# Patient Record
Sex: FEMALE | Race: BLACK OR AFRICAN AMERICAN | HISPANIC OR LATINO | Employment: OTHER | URBAN - METROPOLITAN AREA
[De-identification: names, ages, dates, MRNs, and addresses within clinical notes are randomized per-mention and may not be internally consistent; named-entity substitution may affect disease eponyms.]

---

## 2018-06-18 ENCOUNTER — APPOINTMENT (EMERGENCY)
Dept: RADIOLOGY | Facility: HOSPITAL | Age: 68
End: 2018-06-18
Payer: COMMERCIAL

## 2018-06-18 ENCOUNTER — HOSPITAL ENCOUNTER (EMERGENCY)
Facility: HOSPITAL | Age: 68
Discharge: HOME | End: 2018-06-18
Attending: EMERGENCY MEDICINE
Payer: COMMERCIAL

## 2018-06-18 VITALS
SYSTOLIC BLOOD PRESSURE: 126 MMHG | HEART RATE: 102 BPM | OXYGEN SATURATION: 99 % | RESPIRATION RATE: 22 BRPM | WEIGHT: 127.8 LBS | TEMPERATURE: 97.2 F | DIASTOLIC BLOOD PRESSURE: 66 MMHG

## 2018-06-18 DIAGNOSIS — R06.00 DYSPNEA: ICD-10-CM

## 2018-06-18 DIAGNOSIS — J45.901 ASTHMA EXACERBATION: Primary | ICD-10-CM

## 2018-06-18 DIAGNOSIS — B34.9 VIRAL SYNDROME: ICD-10-CM

## 2018-06-18 LAB
ANION GAP SERPL CALCULATED.3IONS-SCNC: 7 MMOL/L (ref 4–13)
BASOPHILS # BLD AUTO: 0.01 THOUSANDS/ΜL (ref 0–0.1)
BASOPHILS NFR BLD AUTO: 0 % (ref 0–1)
BUN SERPL-MCNC: 16 MG/DL (ref 5–25)
CALCIUM SERPL-MCNC: 8.9 MG/DL (ref 8.3–10.1)
CHLORIDE SERPL-SCNC: 100 MMOL/L (ref 100–108)
CO2 SERPL-SCNC: 30 MMOL/L (ref 21–32)
CREAT SERPL-MCNC: 1.01 MG/DL (ref 0.6–1.3)
EOSINOPHIL # BLD AUTO: 0.07 THOUSAND/ΜL (ref 0–0.61)
EOSINOPHIL NFR BLD AUTO: 1 % (ref 0–6)
ERYTHROCYTE [DISTWIDTH] IN BLOOD BY AUTOMATED COUNT: 13.2 % (ref 11.6–15.1)
GFR SERPL CREATININE-BSD FRML MDRD: 57 ML/MIN/1.73SQ M
GLUCOSE SERPL-MCNC: 335 MG/DL (ref 65–140)
HCT VFR BLD AUTO: 37.8 % (ref 34.8–46.1)
HGB BLD-MCNC: 13 G/DL (ref 11.5–15.4)
LYMPHOCYTES # BLD AUTO: 3.36 THOUSANDS/ΜL (ref 0.6–4.47)
LYMPHOCYTES NFR BLD AUTO: 24 % (ref 14–44)
MCH RBC QN AUTO: 30.9 PG (ref 26.8–34.3)
MCHC RBC AUTO-ENTMCNC: 34.4 G/DL (ref 31.4–37.4)
MCV RBC AUTO: 90 FL (ref 82–98)
MONOCYTES # BLD AUTO: 1.08 THOUSAND/ΜL (ref 0.17–1.22)
MONOCYTES NFR BLD AUTO: 8 % (ref 4–12)
NEUTROPHILS # BLD AUTO: 9.41 THOUSANDS/ΜL (ref 1.85–7.62)
NEUTS SEG NFR BLD AUTO: 68 % (ref 43–75)
NRBC BLD AUTO-RTO: 0 /100 WBCS
NT-PROBNP SERPL-MCNC: 103 PG/ML
PLATELET # BLD AUTO: 260 THOUSANDS/UL (ref 149–390)
PMV BLD AUTO: 10 FL (ref 8.9–12.7)
POTASSIUM SERPL-SCNC: 3.1 MMOL/L (ref 3.5–5.3)
RBC # BLD AUTO: 4.21 MILLION/UL (ref 3.81–5.12)
SODIUM SERPL-SCNC: 137 MMOL/L (ref 136–145)
TROPONIN I SERPL-MCNC: <0.02 NG/ML
WBC # BLD AUTO: 13.93 THOUSAND/UL (ref 4.31–10.16)

## 2018-06-18 PROCEDURE — 85025 COMPLETE CBC W/AUTO DIFF WBC: CPT | Performed by: EMERGENCY MEDICINE

## 2018-06-18 PROCEDURE — 36415 COLL VENOUS BLD VENIPUNCTURE: CPT | Performed by: EMERGENCY MEDICINE

## 2018-06-18 PROCEDURE — 96374 THER/PROPH/DIAG INJ IV PUSH: CPT

## 2018-06-18 PROCEDURE — 84484 ASSAY OF TROPONIN QUANT: CPT | Performed by: EMERGENCY MEDICINE

## 2018-06-18 PROCEDURE — 99284 EMERGENCY DEPT VISIT MOD MDM: CPT

## 2018-06-18 PROCEDURE — 93005 ELECTROCARDIOGRAM TRACING: CPT

## 2018-06-18 PROCEDURE — 83880 ASSAY OF NATRIURETIC PEPTIDE: CPT | Performed by: EMERGENCY MEDICINE

## 2018-06-18 PROCEDURE — 71046 X-RAY EXAM CHEST 2 VIEWS: CPT

## 2018-06-18 PROCEDURE — 94640 AIRWAY INHALATION TREATMENT: CPT

## 2018-06-18 PROCEDURE — 80048 BASIC METABOLIC PNL TOTAL CA: CPT | Performed by: EMERGENCY MEDICINE

## 2018-06-18 RX ORDER — AZITHROMYCIN 250 MG/1
TABLET, FILM COATED ORAL
Qty: 4 TABLET | Refills: 0 | Status: SHIPPED | OUTPATIENT
Start: 2018-06-18 | End: 2018-06-22

## 2018-06-18 RX ORDER — ALBUTEROL SULFATE 2.5 MG/3ML
5 SOLUTION RESPIRATORY (INHALATION) ONCE
Status: COMPLETED | OUTPATIENT
Start: 2018-06-18 | End: 2018-06-18

## 2018-06-18 RX ORDER — AZITHROMYCIN 250 MG/1
500 TABLET, FILM COATED ORAL ONCE
Status: COMPLETED | OUTPATIENT
Start: 2018-06-18 | End: 2018-06-18

## 2018-06-18 RX ORDER — METHYLPREDNISOLONE SODIUM SUCCINATE 125 MG/2ML
125 INJECTION, POWDER, LYOPHILIZED, FOR SOLUTION INTRAMUSCULAR; INTRAVENOUS ONCE
Status: COMPLETED | OUTPATIENT
Start: 2018-06-18 | End: 2018-06-18

## 2018-06-18 RX ORDER — PREDNISONE 20 MG/1
20 TABLET ORAL DAILY
Qty: 12 TABLET | Refills: 0 | Status: SHIPPED | OUTPATIENT
Start: 2018-06-18 | End: 2018-08-15

## 2018-06-18 RX ADMIN — IPRATROPIUM BROMIDE 0.5 MG: 0.5 SOLUTION RESPIRATORY (INHALATION) at 21:20

## 2018-06-18 RX ADMIN — ALBUTEROL SULFATE 5 MG: 2.5 SOLUTION RESPIRATORY (INHALATION) at 21:20

## 2018-06-18 RX ADMIN — METHYLPREDNISOLONE SODIUM SUCCINATE 125 MG: 125 INJECTION, POWDER, FOR SOLUTION INTRAMUSCULAR; INTRAVENOUS at 21:16

## 2018-06-18 RX ADMIN — AZITHROMYCIN MONOHYDRATE 500 MG: 250 TABLET ORAL at 22:05

## 2018-06-19 LAB
ATRIAL RATE: 93 BPM
P AXIS: 48 DEGREES
PR INTERVAL: 116 MS
QRS AXIS: 61 DEGREES
QRSD INTERVAL: 88 MS
QT INTERVAL: 324 MS
QTC INTERVAL: 402 MS
T WAVE AXIS: 32 DEGREES
VENTRICULAR RATE: 93 BPM

## 2018-06-19 PROCEDURE — 93010 ELECTROCARDIOGRAM REPORT: CPT | Performed by: INTERNAL MEDICINE

## 2018-06-19 NOTE — ED PROVIDER NOTES
History  Chief Complaint   Patient presents with    Cough     productive cough for one week, exertional SOB, seen at [facility], given steroids, states symptoms remain     76-year-old female presents for evaluation of productive cough and dyspnea this been ongoing for the last week  She also has a history of asthma  Breathing treatment administered prior to arrival with mild relief  She also reports chest pain that is only exacerbated by coughing and palpation  Nonradiating a denies nausea, vomiting, diaphoresis  Also reports subjective fevers and generalized body aches  Patient reports completing a 5 day course of steroids 2 days ago  None       Past Medical History:   Diagnosis Date    Asthma     Diabetes mellitus ([facility] [address] )     Hyperlipidemia     Hypertension        History reviewed  No pertinent surgical history  History reviewed  No pertinent family history  I have reviewed and agree with the history as documented  Social History   Substance Use Topics    Smoking status: Never Smoker    Smokeless tobacco: Never Used    Alcohol use No        Review of Systems   Constitutional: Positive for fever  Negative for chills and diaphoresis  HENT: Positive for congestion  Negative for rhinorrhea  Eyes: Negative for pain and visual disturbance  Respiratory: Positive for cough and shortness of breath  Negative for wheezing  Cardiovascular: Positive for chest pain  Negative for leg swelling  Gastrointestinal: Negative for abdominal pain, diarrhea, nausea and vomiting  Genitourinary: Negative for difficulty urinating, dysuria, frequency and urgency  Musculoskeletal: Negative for back pain and neck pain  Skin: Negative for color change and rash  Neurological: Negative for syncope, numbness and headaches  All other systems reviewed and are negative        Physical Exam  ED Triage Vitals [06/18/18 1959]   Temperature Pulse Respirations Blood Pressure SpO2   (!) 97 2 °F (36 2 °C) (!) 114 18 (!) 174/95 96 %      Temp Source Heart Rate Source Patient Position - Orthostatic VS BP Location FiO2 (%)   Temporal Monitor Sitting Right arm --      Pain Score       --           Orthostatic Vital Signs  Vitals:    06/18/18 2050 06/18/18 2130 06/18/18 2207 06/18/18 2230   BP: 144/76 142/76 131/71 126/66   Pulse: 92 90 (!) 115 102   Patient Position - Orthostatic VS: Lying Lying  Lying       Physical Exam   Constitutional: She is oriented to person, place, and time  She appears well-developed and well-nourished  No distress  HENT:   Head: Normocephalic and atraumatic  Eyes: Conjunctivae and EOM are normal    Neck: Normal range of motion  Neck supple  Cardiovascular: Normal rate and regular rhythm  Pulmonary/Chest: Effort normal  No respiratory distress  She has wheezes  She has no rales  Abdominal: Soft  Bowel sounds are normal  There is no tenderness  There is no guarding  Musculoskeletal: Normal range of motion  She exhibits no edema or tenderness  Neurological: She is alert and oriented to person, place, and time  No cranial nerve deficit  Skin: Skin is warm  She is not diaphoretic  No erythema  Psychiatric: She has a normal mood and affect  Her behavior is normal    Nursing note and vitals reviewed        ED Medications  Medications   albuterol inhalation solution 5 mg (5 mg Nebulization Given 6/18/18 2120)   ipratropium (ATROVENT) 0 02 % inhalation solution 0 5 mg (0 5 mg Nebulization Given 6/18/18 2120)   methylPREDNISolone sodium succinate (Solu-MEDROL) injection 125 mg (125 mg Intravenous Given 6/18/18 2116)   azithromycin (ZITHROMAX) tablet 500 mg (500 mg Oral Given 6/18/18 2205)       Diagnostic Studies  Results Reviewed     Procedure Component Value Units Date/Time    Basic metabolic panel [[id number]]  (Abnormal) Collected:  06/18/18 2046    Lab Status:  Final result Specimen:  Blood from Arm, Right Updated:  06/18/18 2113     Sodium 137 mmol/L      Potassium 3 1 (L) mmol/L Chloride 100 mmol/L      CO2 30 mmol/L      Anion Gap 7 mmol/L      BUN 16 mg/dL      Creatinine 1 01 mg/dL      Glucose 335 (H) mg/dL      Calcium 8 9 mg/dL      eGFR 57 ml/min/1 73sq m     Narrative:         National Kidney Disease Education Program recommendations are as follows:  GFR calculation is accurate only with a steady state creatinine  Chronic Kidney disease less than 60 ml/min/1 73 sq  meters  Kidney failure less than 15 ml/min/1 73 sq  meters  NT-BNP PRO [[id number]]  (Normal) Collected:  06/18/18 2046    Lab Status:  Final result Specimen:  Blood from Arm, Right Updated:  06/18/18 2113     NT-proBNP 103 pg/mL     Troponin I [[id number]]  (Normal) Collected:  06/18/18 2046    Lab Status:  Final result Specimen:  Blood from Arm, Right Updated:  06/18/18 2110     Troponin I <0 02 ng/mL     CBC and differential [[id number]]  (Abnormal) Collected:  06/18/18 2046    Lab Status:  Final result Specimen:  Blood from Arm, Right Updated:  06/18/18 2052     WBC 13 93 (H) Thousand/uL      RBC 4 21 Million/uL      Hemoglobin 13 0 g/dL      Hematocrit 37 8 %      MCV 90 fL      MCH 30 9 pg      MCHC 34 4 g/dL      RDW 13 2 %      MPV 10 0 fL      Platelets 677 Thousands/uL      nRBC 0 /100 WBCs      Neutrophils Relative 68 %      Lymphocytes Relative 24 %      Monocytes Relative 8 %      Eosinophils Relative 1 %      Basophils Relative 0 %      Neutrophils Absolute 9 41 (H) Thousands/µL      Lymphocytes Absolute 3 36 Thousands/µL      Monocytes Absolute 1 08 Thousand/µL      Eosinophils Absolute 0 07 Thousand/µL      Basophils Absolute 0 01 Thousands/µL                  X-ray chest 2 views   ED Interpretation by Heavenly Goode MD (06/18 2147)   The CXR was ordered by resident and interpreted by me independently  On my read, it appears normal without acute abnormalities               Procedures  Procedures      Phone Consults  ED Phone Contact    ED Course  ED Course as of Jun 19 0038 Mon Jun 18, 2018 2108 Procedure Note: EKG  Date/Time: 06/18/18 9:07 PM   Performed by: Lan Smith by: Cl Bhatia  ECG interpreted by me, the ED Provider: yes   The EKG demonstrates:  Rate 93  Rhythm NSR  QTc 402  No ST elevatons/depressions      2140 Patient reports improvement of symptoms with duoneb  2233 Patient states she feels much better after duoneb  Will DC, rx for azithro and steroid taper  Follow up with PCP  HEART Risk Score      Most Recent Value   History  0 Filed at: 06/18/2018 2143   ECG  0 Filed at: 06/18/2018 2143   Age  2 Filed at: 06/18/2018 2143   Risk Factors  1 Filed at: 06/18/2018 2143   Troponin  0 Filed at: 06/18/2018 2143   Heart Score Risk Calculator   History  0 Filed at: 06/18/2018 2143   ECG  0 Filed at: 06/18/2018 2143   Age  2 Filed at: 06/18/2018 2143   Risk Factors  1 Filed at: 06/18/2018 2143   Troponin  0 Filed at: 06/18/2018 2143   HEART Score  3 Filed at: 06/18/2018 2143   HEART Score  3 Filed at: 06/18/2018 2143                            MDM  Number of Diagnoses or Management Options  Diagnosis management comments: 69year old female presenting with cough, congestion, wheezing  Will obtain labs, CXR, breathing tx, steroids  Reassess  CritCare Time    Disposition  Final diagnoses:   Asthma exacerbation   Dyspnea   Viral syndrome     Time reflects when diagnosis was documented in both MDM as applicable and the Disposition within this note     Time User Action Codes Description Comment    6/18/2018 10:34 PM Cl Cluster Add [E37 423] Asthma exacerbation     6/18/2018 10:34 PM Cl Cluster Add [R06 00] Dyspnea     6/18/2018 10:34 PM Cl Cluster Add [B34 9] Viral syndrome       ED Disposition     ED Disposition Condition Comment    Discharge  Mildred Fierro discharge to home/self care      Condition at discharge: Stable        Follow-up Information     Follow up With Specialties Details Why Contact Info Additional [facility] [place] [facility] In 1 day For further evaluation [address] [facility] [address] [address]  757 [facility] Emergency Department Emergency Medicine  If symptoms worsen 3050 [facility] ED, [address] , [place], [place], [address]          Discharge Medication List as of 6/18/2018 10:40 PM      START taking these medications    Details   azithromycin (ZITHROMAX) 250 mg tablet Take 1 tablet daily x 4 days (First 2 tablets administered in Emergency Department), Print      predniSONE 20 mg tablet Take 1 tablet (20 mg total) by mouth daily Take 3 tablets (60mg) for 2 days, then take 2 tablets (40mg) for 2 days, then 1 tablet (20mg) for 2 days  , Starting Mon 6/18/2018, Print           No discharge procedures on file  ED Provider  Attending physically available and evaluated Traceymyke Viktoriya VERNON managed the patient along with the ED Attending      Electronically Signed by         Charisma Vallejo DO  06/19/18 0038

## 2018-06-19 NOTE — DISCHARGE INSTRUCTIONS
Asma, cuidados ambulatorios   INFORMACIÓN GENERAL:   El asma  es margot enfermedad pulmonar que dificulta la respiración  La inflamación crónica y las reacciones a los factores desencadenantes estrechan las vías respiratorias en anusha pulmones  El asma puede ser mortal si no se controla  Los síntomas comunes incluyen los siguientes:   · Tos     · Sibilancias     · Falta de aliento     · Opresión en el pecho  Busque cuidados inmediatos para los siguientes síntomas:   · Falta de aliento severa    · Labios o uñas azules o grises    · La piel alrededor de cabrera karri y costillas se hunde con cada respiro    · Falta de [place], aún después de tomarse anusha medicamentos de uso a corto plazo según anusha indicaciones    · Las lecturas numéricas del medidor de cabrera flujo rambo están en la miya tati de cabrera plan de acción para el asma  El tratamiento para el asma  dependerá de cabrera severidad  Es posible que los medicamentos disminuyan la inflamación, alma las vías respiratorias y faciliten cabrera respiración  Los medicamentos pueden inhalarse, tomarse en pastilla o [place]  Los [place] de uso a corto plazo alivian anusha síntomas rápidamente  Los medicamentos a jaison plazo se usan para prevenir ataques futuros  Puede ser que usted también necesite medicamentos para ayudar a controlar anusha alergias  Massimo Tania y prevenga futuros ataques de asma:   · Siga cabrera plan de acción para el asma  Ifrah es un plan escrito que usted y cabrera médico Jb Trotter  Ifrah explica cual medicamento usted necesita y si es necesario, y cuando cambiar la dosis  También explica cómo controlar los síntomas y utilizar un medidor de flujo rambo (alto)  El medidor mide qué tan alessia están funcionando los pulmones  · [address] , wagner las [place], el reflujo estomacal y la apnea de sueño  · Identifique y evite los factores desencadenantes  Estos pueden [employer], los ácaros del Silva, el moho y las cucarachas       · No fume y evite a los fumadores  Si usted fuma, nunca es tarde para dejar de hacerlo  Consulte con anthony médico si necesita ayuda para dejar de fumar  · Consulte sobre la vacuna contra la gripe  La gripe puede empeorar anthony asma  Es posible que necesite de margot vacuna contra la gripe cada año  Programe margot tariq de seguimiento con anthony proveedor de mindy según indicaciones:  Usted tendrá que regresar para asegurarse de que anthony medicamento esté funcionando y anusha síntomas están controlados  Es probable que a usted lo refieran a un especialista en asma o alergias  Seguramente le pedirán que anote los valores numéricos de anthony medidor del flujo rambo para que los lleve a anusha citas de seguimiento  Anote anusha preguntas para que las recuerde david anusha citas de seguimiento  ACUERDOS SOBRE ANTHONY CUIDADO:   Usted tiene el derecho de participar en la planificación de anthony cuidado  Aprenda todo lo que pueda sobre anthony condición y wagner darle tratamiento  Discuta con anusha médicos anusha opciones de tratamiento para juntos decidir el cuidado que usted quiere recibir  Usted siempre tiene el derecho a rechazar anthony tratamiento  Esta información es sólo para uso en educación  Anthony intención no es darle un consejo médico sobre enfermedades o tratamientos  Colsulte con anthony [employer] antes de seguir cualquier régimen médico para saber si es seguro y efectivo para usted  © 2014 [address] is for End User's use only and may not be sold, redistributed or otherwise used for commercial purposes  All illustrations and images included in CareNotes® are the copyrighted property of A D A M , Inc  or Pb Lyle

## 2018-06-19 NOTE — ED ATTENDING ATTESTATION
Jalyn Atkins MD, saw and evaluated the patient  All available labs and X-rays were ordered by me or the resident and have been reviewed by myself  I discussed the patient with the resident / non-physician and agree with the resident's / non-physician practitioner's findings and plan as documented in the resident's / non-physician practicitioner's note, except where noted  At this point, I agree with the current assessment done in the ED  Chief Complaint   Patient presents with    Cough     productive cough for one week, exertional SOB, seen at [facility], given steroids, states symptoms remain     This is a 70-year-old female presenting for evaluation of cough x1 week with shortness of breath  The patient states that she was given some steroids about a week ago and it seemed to help her symptoms little bit but she finished steroids and was offered for 2 days but despite that she has been having worsened symptoms  Denies any fevers chills nausea vomiting  She does have a lot of coughing that is sometimes productive  Denies dizziness or lightheadedness  She has body aches all over  PMH:  - asthma  - DM  - HTN  - HLD  PSH:  - none  No smoking, drinking, drugs  PE:  Vitals:    06/18/18 2050 06/18/18 2130 06/18/18 2207 06/18/18 2230   BP: 144/76 142/76 131/71 126/66   BP Location: Left arm Right arm Right arm Left arm   Pulse: 92 90 (!) 115 102   Resp: 15 15 18 22   Temp:       TempSrc:       SpO2: 96% 99% 95% 99%   Weight:       General: VSS, NAD, awake, alert  Well-nourished, well-developed  Appears stated age  Speaking normally in full sentences  Head: Normocephalic, atraumatic, nontender  Eyes: PERRL, EOM-I  No diplopia  No hyphema  No subconjunctival hemorrhages  Symmetrical lids  ENT: Atraumatic external nose and ears  MMM  No malocclusion  No stridor  Normal phonation  No drooling  Normal swallowing  Neck: Symmetric, trachea midline  No JVD    CV: RRR  +S1/S2  No murmurs or gallops  Peripheral pulses +2 throughout  No chest wall tenderness  Lungs:   Unlabored No retractions  Wheezing expiratory  lungs sounds equal bilateral    No tachypnea  Abd: +BS, soft, NT/ND    MSK:   FROM   Back:   No rashes  Skin: Dry, intact  Neuro: AAOx3, GCS 15, CN II-XII grossly intact  Motor grossly intact  Psychiatric/Behavioral: Appropriate mood and affect   Exam: deferred  A:  - bronchitis/pna  P:  - cardiac workup  - likely DC home   - 13 point ROS was performed and all are normal unless stated in the history above  - Nursing note reviewed  Vitals reviewed  - Orders placed by myself and/or advanced practitioner / resident     - Previous chart was reviewed  - No language barrier    - History obtained from patient  - There are no limitations to the history obtained  - Critical care time: Not applicable for this patient  Final Diagnosis:  1  Asthma exacerbation    2  Dyspnea    3  Viral syndrome        ED Course as of Jun 18 2346   Mon Jun 18, 2018 2056 On steroid regimen   WBC: (!) 13 93     Medications   albuterol inhalation solution 5 mg (5 mg Nebulization Given 6/18/18 2120)   ipratropium (ATROVENT) 0 02 % inhalation solution 0 5 mg (0 5 mg Nebulization Given 6/18/18 2120)   methylPREDNISolone sodium succinate (Solu-MEDROL) injection 125 mg (125 mg Intravenous Given 6/18/18 2116)   azithromycin (ZITHROMAX) tablet 500 mg (500 mg Oral Given 6/18/18 2205)     X-ray chest 2 views   ED Interpretation   The CXR was ordered by resident and interpreted by me independently  On my read, it appears normal without acute abnormalities           Orders Placed This Encounter   Procedures    X-ray chest 2 views    CBC and differential    Basic metabolic panel    Troponin I    NT-BNP PRO    Continuous cardiac monitoring    Continuous pulse oximetry    Insert peripheral IV    ECG 12 lead     Labs Reviewed   CBC AND DIFFERENTIAL - Abnormal        Result Value Ref Range Status    WBC 13 93 (*) 4 31 - 10 16 Thousand/uL Final    RBC 4 21  3 81 - 5 12 Million/uL Final    Hemoglobin 13 0  11 5 - 15 4 g/dL Final    Hematocrit 37 8  34 8 - 46 1 % Final    MCV 90  82 - 98 fL Final    MCH 30 9  26 8 - 34 3 pg Final    MCHC 34 4  31 4 - 37 4 g/dL Final    RDW 13 2  11 6 - 15 1 % Final    MPV 10 0  8 9 - 12 7 fL Final    Platelets 902  121 - 390 Thousands/uL Final    nRBC 0  /100 WBCs Final    Neutrophils Relative 68  43 - 75 % Final    Lymphocytes Relative 24  14 - 44 % Final    Monocytes Relative 8  4 - 12 % Final    Eosinophils Relative 1  0 - 6 % Final    Basophils Relative 0  0 - 1 % Final    Neutrophils Absolute 9 41 (*) 1 85 - 7 62 Thousands/µL Final    Lymphocytes Absolute 3 36  0 60 - 4 47 Thousands/µL Final    Monocytes Absolute 1 08  0 17 - 1 22 Thousand/µL Final    Eosinophils Absolute 0 07  0 00 - 0 61 Thousand/µL Final    Basophils Absolute 0 01  0 00 - 0 10 Thousands/µL Final   BASIC METABOLIC PANEL - Abnormal     Sodium 137  136 - 145 mmol/L Final    Potassium 3 1 (*) 3 5 - 5 3 mmol/L Final    Chloride 100  100 - 108 mmol/L Final    CO2 30  21 - 32 mmol/L Final    Anion Gap 7  4 - 13 mmol/L Final    BUN 16  5 - 25 mg/dL Final    Creatinine 1 01  0 60 - 1 30 mg/dL Final    Comment: Standardized to IDMS reference method    Glucose 335 (*) 65 - 140 mg/dL Final    Comment:   If the patient is fasting, the ADA then defines impaired fasting glucose as > 100 mg/dL and diabetes as > or equal to 123 mg/dL  Specimen collection should occur prior to Sulfasalazine administration due to the potential for falsely depressed results  Specimen collection should occur prior to Sulfapyridine administration due to the potential for falsely elevated results      Calcium 8 9  8 3 - 10 1 mg/dL Final    eGFR 57  ml/min/1 73sq m Final    Narrative:     National Kidney Disease Education Program recommendations are as follows:  GFR calculation is accurate only with a steady state creatinine  Chronic Kidney disease less than 60 ml/min/1 73 sq  meters  Kidney failure less than 15 ml/min/1 73 sq  meters  TROPONIN I - Normal    Troponin I <0 02  <=0 04 ng/mL Final    Comment: 3Autovalidation override  [employer] Chemistry analyzer 99% cutoff is > 0 04 ng/mL in network labs     o cTnI 99% cutoff is useful only when applied to patients in the clinical setting of myocardial ischemia   o cTnI 99% cutoff should be interpreted in the context of clinical history, ECG findings and possibly cardiac imaging to establish correct diagnosis  o cTnI 99% cutoff may be suggestive but clearly not indicative of a coronary event without the clinical setting of myocardial ischemia  NT-BNP PRO (BRAIN NATRIURETIC PEPTIDE) - Normal    NT-proBNP 103  <125 pg/mL Final     Time reflects when diagnosis was documented in both MDM as applicable and the Disposition within this note     Time User Action Codes Description Comment    6/18/2018 10:34 PM Carlos Rojas Add [F92 307] Asthma exacerbation     6/18/2018 10:34 PM Carlos Rojas Add [R06 00] Dyspnea     6/18/2018 10:34 PM Carlos Rojas Add [B34 9] Viral syndrome       ED Disposition     ED Disposition Condition Comment    Discharge  [facility] discharge to home/self care      Condition at discharge: Stable        Follow-up Information     Follow up With Specialties Details Why Contact Info Additional [place] [address] In 1 day For further evaluation [address] [place] [address] Drive [address] 219 [facility] Emergency Department Emergency Medicine  If symptoms worsen [address]  [phone] [place] ED, [address] [place] [address], [address]        Discharge Medication List as of 6/18/2018 10:40 PM      START taking these medications    Details   azithromycin (ZITHROMAX) 250 mg tablet Take 1 tablet daily x 4 days (First 2 tablets administered in Emergency Department), Print      predniSONE 20 mg tablet Take 1 tablet (20 mg total) by mouth daily Take 3 tablets (60mg) for 2 days, then take 2 tablets (40mg) for 2 days, then 1 tablet (20mg) for 2 days  , Starting Mon 6/18/2018, Print           No discharge procedures on file  None       Portions of the record may have been created with voice recognition software  Occasional wrong word or "sound a like" substitutions may have occurred due to the inherent limitations of voice recognition software  Read the chart carefully and recognize, using context, where substitutions have occurred      Electronically signed by:  Kaitlyn Borrego

## 2018-08-15 ENCOUNTER — APPOINTMENT (EMERGENCY)
Dept: CT IMAGING | Facility: HOSPITAL | Age: 68
End: 2018-08-15
Payer: COMMERCIAL

## 2018-08-15 ENCOUNTER — HOSPITAL ENCOUNTER (EMERGENCY)
Facility: HOSPITAL | Age: 68
Discharge: HOME | End: 2018-08-15
Attending: EMERGENCY MEDICINE | Admitting: EMERGENCY MEDICINE
Payer: COMMERCIAL

## 2018-08-15 VITALS
OXYGEN SATURATION: 98 % | RESPIRATION RATE: 16 BRPM | TEMPERATURE: 98.1 F | HEART RATE: 75 BPM | DIASTOLIC BLOOD PRESSURE: 67 MMHG | WEIGHT: 129 LBS | SYSTOLIC BLOOD PRESSURE: 142 MMHG

## 2018-08-15 DIAGNOSIS — R10.9 ABDOMINAL PAIN: ICD-10-CM

## 2018-08-15 DIAGNOSIS — R19.7 DIARRHEA: Primary | ICD-10-CM

## 2018-08-15 LAB
ALBUMIN SERPL BCP-MCNC: 3.7 G/DL (ref 3.5–5)
ALP SERPL-CCNC: 98 U/L (ref 46–116)
ALT SERPL W P-5'-P-CCNC: 17 U/L (ref 12–78)
ANION GAP SERPL CALCULATED.3IONS-SCNC: 7 MMOL/L (ref 4–13)
AST SERPL W P-5'-P-CCNC: 14 U/L (ref 5–45)
BACTERIA UR QL AUTO: ABNORMAL /HPF
BASOPHILS # BLD AUTO: 0.02 THOUSANDS/ΜL (ref 0–0.1)
BASOPHILS NFR BLD AUTO: 0 % (ref 0–1)
BILIRUB SERPL-MCNC: 0.2 MG/DL (ref 0.2–1)
BILIRUB UR QL STRIP: NEGATIVE
BUN SERPL-MCNC: 14 MG/DL (ref 5–25)
CALCIUM SERPL-MCNC: 9.5 MG/DL (ref 8.3–10.1)
CHLORIDE SERPL-SCNC: 107 MMOL/L (ref 100–108)
CLARITY UR: CLEAR
CO2 SERPL-SCNC: 30 MMOL/L (ref 21–32)
COLOR UR: YELLOW
CREAT SERPL-MCNC: 0.86 MG/DL (ref 0.6–1.3)
EOSINOPHIL # BLD AUTO: 0.26 THOUSAND/ΜL (ref 0–0.61)
EOSINOPHIL NFR BLD AUTO: 3 % (ref 0–6)
ERYTHROCYTE [DISTWIDTH] IN BLOOD BY AUTOMATED COUNT: 14 % (ref 11.6–15.1)
GFR SERPL CREATININE-BSD FRML MDRD: 69 ML/MIN/1.73SQ M
GLUCOSE SERPL-MCNC: 154 MG/DL (ref 65–140)
GLUCOSE UR STRIP-MCNC: NEGATIVE MG/DL
HCT VFR BLD AUTO: 35.4 % (ref 34.8–46.1)
HGB BLD-MCNC: 11.9 G/DL (ref 11.5–15.4)
HGB UR QL STRIP.AUTO: ABNORMAL
KETONES UR STRIP-MCNC: NEGATIVE MG/DL
LEUKOCYTE ESTERASE UR QL STRIP: ABNORMAL
LIPASE SERPL-CCNC: 164 U/L (ref 73–393)
LYMPHOCYTES # BLD AUTO: 3.29 THOUSANDS/ΜL (ref 0.6–4.47)
LYMPHOCYTES NFR BLD AUTO: 40 % (ref 14–44)
MCH RBC QN AUTO: 31.2 PG (ref 26.8–34.3)
MCHC RBC AUTO-ENTMCNC: 33.6 G/DL (ref 31.4–37.4)
MCV RBC AUTO: 93 FL (ref 82–98)
MONOCYTES # BLD AUTO: 0.47 THOUSAND/ΜL (ref 0.17–1.22)
MONOCYTES NFR BLD AUTO: 6 % (ref 4–12)
NEUTROPHILS # BLD AUTO: 4.25 THOUSANDS/ΜL (ref 1.85–7.62)
NEUTS SEG NFR BLD AUTO: 51 % (ref 43–75)
NITRITE UR QL STRIP: NEGATIVE
NON-SQ EPI CELLS URNS QL MICRO: ABNORMAL /HPF
NRBC BLD AUTO-RTO: 0 /100 WBCS
PH UR STRIP.AUTO: 5.5 [PH] (ref 4.5–8)
PLATELET # BLD AUTO: 283 THOUSANDS/UL (ref 149–390)
PMV BLD AUTO: 10.5 FL (ref 8.9–12.7)
POTASSIUM SERPL-SCNC: 3.9 MMOL/L (ref 3.5–5.3)
PROT SERPL-MCNC: 7.3 G/DL (ref 6.4–8.2)
PROT UR STRIP-MCNC: NEGATIVE MG/DL
RBC # BLD AUTO: 3.81 MILLION/UL (ref 3.81–5.12)
RBC #/AREA URNS AUTO: ABNORMAL /HPF
SODIUM SERPL-SCNC: 144 MMOL/L (ref 136–145)
SP GR UR STRIP.AUTO: 1.02 (ref 1–1.03)
TROPONIN I SERPL-MCNC: <0.02 NG/ML
UROBILINOGEN UR QL STRIP.AUTO: 0.2 E.U./DL
WBC # BLD AUTO: 8.29 THOUSAND/UL (ref 4.31–10.16)
WBC #/AREA URNS AUTO: ABNORMAL /HPF

## 2018-08-15 PROCEDURE — 96374 THER/PROPH/DIAG INJ IV PUSH: CPT

## 2018-08-15 PROCEDURE — 99284 EMERGENCY DEPT VISIT MOD MDM: CPT

## 2018-08-15 PROCEDURE — 80053 COMPREHEN METABOLIC PANEL: CPT | Performed by: EMERGENCY MEDICINE

## 2018-08-15 PROCEDURE — 96375 TX/PRO/DX INJ NEW DRUG ADDON: CPT

## 2018-08-15 PROCEDURE — 85025 COMPLETE CBC W/AUTO DIFF WBC: CPT | Performed by: EMERGENCY MEDICINE

## 2018-08-15 PROCEDURE — 36415 COLL VENOUS BLD VENIPUNCTURE: CPT | Performed by: EMERGENCY MEDICINE

## 2018-08-15 PROCEDURE — 83690 ASSAY OF LIPASE: CPT | Performed by: EMERGENCY MEDICINE

## 2018-08-15 PROCEDURE — 96361 HYDRATE IV INFUSION ADD-ON: CPT

## 2018-08-15 PROCEDURE — 74177 CT ABD & PELVIS W/CONTRAST: CPT

## 2018-08-15 PROCEDURE — 84484 ASSAY OF TROPONIN QUANT: CPT | Performed by: EMERGENCY MEDICINE

## 2018-08-15 PROCEDURE — 81001 URINALYSIS AUTO W/SCOPE: CPT

## 2018-08-15 RX ORDER — ATORVASTATIN CALCIUM 10 MG/1
10 TABLET, FILM COATED ORAL DAILY
COMMUNITY
End: 2018-08-29

## 2018-08-15 RX ORDER — LISINOPRIL 30 MG/1
30 TABLET ORAL DAILY
COMMUNITY
End: 2018-08-29

## 2018-08-15 RX ORDER — ASPIRIN 81 MG/1
81 TABLET ORAL DAILY
COMMUNITY

## 2018-08-15 RX ORDER — DICYCLOMINE HCL 20 MG
20 TABLET ORAL EVERY 6 HOURS PRN
Qty: 20 TABLET | Refills: 0 | Status: SHIPPED | OUTPATIENT
Start: 2018-08-15

## 2018-08-15 RX ORDER — KETOROLAC TROMETHAMINE 30 MG/ML
15 INJECTION, SOLUTION INTRAMUSCULAR; INTRAVENOUS ONCE
Status: COMPLETED | OUTPATIENT
Start: 2018-08-15 | End: 2018-08-15

## 2018-08-15 RX ORDER — ONDANSETRON 2 MG/ML
4 INJECTION INTRAMUSCULAR; INTRAVENOUS ONCE
Status: COMPLETED | OUTPATIENT
Start: 2018-08-15 | End: 2018-08-15

## 2018-08-15 RX ORDER — DICYCLOMINE HCL 20 MG
20 TABLET ORAL ONCE
Status: COMPLETED | OUTPATIENT
Start: 2018-08-15 | End: 2018-08-15

## 2018-08-15 RX ADMIN — IOHEXOL 100 ML: 350 INJECTION, SOLUTION INTRAVENOUS at 17:41

## 2018-08-15 RX ADMIN — ONDANSETRON 4 MG: 2 INJECTION INTRAMUSCULAR; INTRAVENOUS at 16:57

## 2018-08-15 RX ADMIN — KETOROLAC TROMETHAMINE 15 MG: 30 INJECTION, SOLUTION INTRAMUSCULAR at 18:10

## 2018-08-15 RX ADMIN — SODIUM CHLORIDE 1000 ML: 0.9 INJECTION, SOLUTION INTRAVENOUS at 16:57

## 2018-08-15 RX ADMIN — DICYCLOMINE HYDROCHLORIDE 20 MG: 20 TABLET ORAL at 18:10

## 2018-08-15 NOTE — ED PROVIDER NOTES
History  Chief Complaint   Patient presents with    Abdominal Pain     Abdominal pain left sided 2 days ago  +diarrhea  History provided by:  Patient   used: No    Abdominal Pain   Pain location:  LLQ  Pain quality: bloating and cramping    Pain radiates to:  Does not radiate  Pain severity:  Moderate  Onset quality:  Gradual  Duration:  2 days  Timing:  Constant  Progression:  Waxing and waning  Chronicity:  New  Relieved by:  Nothing  Worsened by:  Nothing  Ineffective treatments:  None tried  Associated symptoms: no chest pain, no cough, no diarrhea, no dysuria, no fatigue, no fever, no nausea, no shortness of breath, no sore throat and no vomiting        Prior to Admission Medications   Prescriptions Last Dose Informant Patient Reported? Taking?   aspirin (ECOTRIN LOW STRENGTH) 81 mg EC tablet   Yes Yes   Sig: Take 81 mg by mouth daily   metFORMIN (GLUCOPHAGE) 500 mg tablet   Yes Yes   Sig: Take 500 mg by mouth daily with breakfast      Facility-Administered Medications: None       Past Medical History:   Diagnosis Date    Asthma     Diabetes mellitus ([facility] [address] )     Hyperlipidemia     Hypertension        Past Surgical History:   Procedure Laterality Date    COLONOSCOPY      5/18/16    TUBAL LIGATION         History reviewed  No pertinent family history  I have reviewed and agree with the history as documented  Social History   Substance Use Topics    Smoking status: Light Tobacco Smoker    Smokeless tobacco: Never Used    Alcohol use Yes      Comment: occasional        Review of Systems   Constitutional: Negative for activity change, appetite change, fatigue, fever and unexpected weight change  HENT: Negative for sore throat and voice change  Eyes: Negative for pain and visual disturbance  Respiratory: Negative for cough, chest tightness and shortness of breath  Cardiovascular: Negative for chest pain     Gastrointestinal: Positive for abdominal pain and blood in stool  Negative for diarrhea, nausea and vomiting  Endocrine: Negative for polyphagia and polyuria  Genitourinary: Negative for difficulty urinating, dysuria, flank pain, frequency and urgency  Musculoskeletal: Negative for back pain, gait problem, neck pain and neck stiffness  Skin: Negative for color change and rash  Allergic/Immunologic: Negative for immunocompromised state  Neurological: Negative for dizziness, syncope, speech difficulty, light-headedness, numbness and headaches  Psychiatric/Behavioral: Negative for confusion and decreased concentration  Physical Exam  Physical Exam   Constitutional: She is oriented to person, place, and time  She appears well-developed and well-nourished  HENT:   Head: Normocephalic and atraumatic  Eyes: Conjunctivae and EOM are normal  Pupils are equal, round, and reactive to light  No scleral icterus  Neck: Normal range of motion  Neck supple  No JVD present  No tracheal deviation present  Cardiovascular: Normal rate and regular rhythm  Pulmonary/Chest: Effort normal and breath sounds normal  No respiratory distress  Abdominal: Soft  She exhibits no distension  There is tenderness  There is no rebound and no guarding  Soft, nd, mild to moderate tenderness in LLQ, no r/g  Musculoskeletal: Normal range of motion  She exhibits no tenderness or deformity  Lymphadenopathy:     She has no cervical adenopathy  Neurological: She is alert and oriented to person, place, and time  No gross focal sensory or motor deficits   Skin: Skin is warm and dry  No rash noted  She is not diaphoretic  Psychiatric: She has a normal mood and affect  Vitals reviewed        Vital Signs  ED Triage Vitals [08/15/18 1534]   Temperature Pulse Respirations Blood Pressure SpO2   98 1 °F (36 7 °C) 84 16 131/63 96 %      Temp Source Heart Rate Source Patient Position - Orthostatic VS BP Location FiO2 (%)   Oral Monitor Sitting Right arm --      Pain Score       9 Vitals:    08/15/18 1534 08/15/18 1647 08/15/18 1757   BP: 131/63 117/66 142/67   Pulse: 84 80 75   Patient Position - Orthostatic VS: Sitting Lying Lying       Visual Acuity      ED Medications  Medications   sodium chloride 0 9 % bolus 1,000 mL (0 mL Intravenous Stopped 8/15/18 1839)   ondansetron (ZOFRAN) injection 4 mg (4 mg Intravenous Given 8/15/18 1657)   iohexol (OMNIPAQUE) 350 MG/ML injection (SINGLE-DOSE) 100 mL (100 mL Intravenous Given 8/15/18 1741)   dicyclomine (BENTYL) tablet 20 mg (20 mg Oral Given 8/15/18 1810)   ketorolac (TORADOL) injection 15 mg (15 mg Intravenous Given 8/15/18 1810)       Diagnostic Studies  Results Reviewed     Procedure Component Value Units Date/Time    Urine Microscopic [[id number]]  (Abnormal) Collected:  08/15/18 1645    Lab Status:  Final result Specimen:  Urine from Urine, Clean Catch Updated:  08/15/18 1844     RBC, UA None Seen /hpf      WBC, UA 1-2 (A) /hpf      Epithelial Cells Occasional /hpf      Bacteria, UA Occasional /hpf     POCT urinalysis dipstick [[id number]]  (Abnormal) Resulted:  08/15/18 1637    Lab Status:  Final result Specimen:  Urine Updated:  08/15/18 1807    Troponin I [[id number]]  (Normal) Collected:  08/15/18 1654    Lab Status:  Final result Specimen:  Blood from Arm, Left Updated:  08/15/18 1720     Troponin I <0 02 ng/mL     Comprehensive metabolic panel [[id number]]  (Abnormal) Collected:  08/15/18 1654    Lab Status:  Final result Specimen:  Blood from Arm, Left Updated:  08/15/18 1718     Sodium 144 mmol/L      Potassium 3 9 mmol/L      Chloride 107 mmol/L      CO2 30 mmol/L      ANION GAP 7 mmol/L      BUN 14 mg/dL      Creatinine 0 86 mg/dL      Glucose 154 (H) mg/dL      Calcium 9 5 mg/dL      AST 14 U/L      ALT 17 U/L      Alkaline Phosphatase 98 U/L      Total Protein 7 3 g/dL      Albumin 3 7 g/dL      Total Bilirubin 0 20 mg/dL      eGFR 69 ml/min/1 73sq m     Narrative:         National Kidney Disease Education Program recommendations are as follows:  GFR calculation is accurate only with a steady state creatinine  Chronic Kidney disease less than 60 ml/min/1 73 sq  meters  Kidney failure less than 15 ml/min/1 73 sq  meters  Lipase [[id number]]  (Normal) Collected:  08/15/18 1654    Lab Status:  Final result Specimen:  Blood from Arm, Left Updated:  08/15/18 1718     Lipase 164 u/L     CBC and differential [[id number]] Collected:  08/15/18 1654    Lab Status:  Final result Specimen:  Blood from Arm, Left Updated:  08/15/18 1705     WBC 8 29 Thousand/uL      RBC 3 81 Million/uL      Hemoglobin 11 9 g/dL      Hematocrit 35 4 %      MCV 93 fL      MCH 31 2 pg      MCHC 33 6 g/dL      RDW 14 0 %      MPV 10 5 fL      Platelets 903 Thousands/uL      nRBC 0 /100 WBCs      Neutrophils Relative 51 %      Lymphocytes Relative 40 %      Monocytes Relative 6 %      Eosinophils Relative 3 %      Basophils Relative 0 %      Neutrophils Absolute 4 25 Thousands/µL      Lymphocytes Absolute 3 29 Thousands/µL      Monocytes Absolute 0 47 Thousand/µL      Eosinophils Absolute 0 26 Thousand/µL      Basophils Absolute 0 02 Thousands/µL     ED Urine Macroscopic [[id number]]  (Abnormal) Collected:  08/15/18 1645    Lab Status:  Final result Specimen:  Urine Updated:  08/15/18 1637     Color, UA Yellow     Clarity, UA Clear     pH, UA 5 5     Leukocytes, UA Trace (A)     Nitrite, UA Negative     Protein, UA Negative mg/dl      Glucose, UA Negative mg/dl      Ketones, UA Negative mg/dl      Urobilinogen, UA 0 2 E U /dl      Bilirubin, UA Negative     Blood, UA Trace (A)     Specific Simpson, UA 1 025    Narrative:       CLINITEK RESULT                 CT abdomen pelvis with contrast   Final Result by Asad Frye MD (08/15 1754)      1  No acute findings in the abdomen or pelvis  2  Scattered colonic diverticulosis  3   2 4 cm uterine leiomyoma suggested on the left              Workstation performed: [id number] Procedures  Procedures       Phone Contacts  ED Phone Contact    ED Course                               MDM  Number of Diagnoses or Management Options  Abdominal pain: new and requires workup  Diarrhea: new and requires workup  Diagnosis management comments: 78 y/o female presents to ED for eval of 2 day hx of abd pain/bloating and some loose stools  No n/v  Reports decreased appetite  Yesterday she did notice some blood on the toilet paper after wiping  Reports that rectal bleeding has since resolved today  She has not had any lightheadedness or dizziness  No chest pain or shortness of breath  Denies any fevers  No recent travel history or sick contacts  She has never had a colonoscopy  She was scheduled to have one done sometime this year  His pain is primarily a cramping sensation in her left abdomen, worst in the left lower quadrant  Pain seems to wax and wane  No clear modifying factors  No radiation to the back  Denies any urinary symptoms  57-year-old female for the above  Laboratory studies to assess for possible pancreatitis or biliary obstructive disease are unremarkable  She did report some rectal bleeding but this has since resolved  She is asymptomatic from this standpoint and her hemoglobin today is 11 9  A CT scan of the abdomen and pelvis demonstrated diverticulosis without signs of diverticulitis or other acute intra-abdominal pathology  She was treated symptomatically in the emergency department and had good relief of her symptoms  This point she will be discharged to follow up with Gastroenterology  Discussed the need for follow-up and possible colonoscopy  We also discussed return precautions for new or worsening symptoms         Amount and/or Complexity of Data Reviewed  Clinical lab tests: reviewed and ordered  Tests in the radiology section of CPT®: ordered and reviewed  Review and summarize past medical records: yes      CritCare Time    Disposition  Final diagnoses:   Diarrhea   Abdominal pain     Time reflects when diagnosis was documented in both MDM as applicable and the Disposition within this note     Time User Action Codes Description Comment    8/15/2018  6:05 PM Janet Hollow Add [R19 7] Diarrhea     8/15/2018  6:05 PM Janet Cazares Add [R10 9] Abdominal pain       ED Disposition     ED Disposition Condition Comment    Discharge  Jacinto Andres discharge to home/self care  Condition at discharge: Good        Follow-up Information     Follow up With Specialties Details Why [address] [facility]  Should follow up with Gastroenterology about a potential colonoscopy  If you have new or worsening symptoms please call your doctor or return to the emergency department  [place]ake Steve [id number]  [phone]          Discharge Medication List as of 8/15/2018  7:10 PM      CONTINUE these medications which have NOT CHANGED    Details   aspirin (ECOTRIN LOW STRENGTH) 81 mg EC tablet Take 81 mg by mouth daily, Historical Med      metFORMIN (GLUCOPHAGE) 500 mg tablet Take 500 mg by mouth daily with breakfast, Historical Med      atorvastatin (LIPITOR) 10 mg tablet Take 10 mg by mouth daily, Historical Med      Fenofibrate (TRICOR PO) Take 20 mg by mouth daily, Historical Med      lisinopril (ZESTRIL) 30 mg tablet Take 30 mg by mouth daily, Historical Med           No discharge procedures on file      ED Provider  Electronically Signed by           Anton Rajan MD  08/30/18 9227

## 2018-08-15 NOTE — DISCHARGE INSTRUCTIONS
Dolor abdominal, cuidados ambulatorios   INFORMACIÓN GENERAL:   El dolor abdominal  puede ser sordo, molesto o Horomerice  Puede sentir dolor en margot miya del abdomen o en todo el abdomen  El dolor puede deberse a ciertos estados wagner estreñimiento, sensibilidad o intoxicación alimentaria, infección o margot obstrucción  Asimismo, el dolor abdominal puede deberse a margot hernia, apendicitis o úlcera  La causa del dolor abdominal puede ser desconocida  Busque cuidados inmediatos para los siguientes síntomas:   · Arnold dolor de pecho o falta de aliento    · Dolor pulsátil en el abdomen superior o en la parte inferior de la espalda que de repente es ceci    · Dolor que se localiza en el abdomen inferior derecho y empeora con el movimiento    · Fiebre por encima de 100 4°F (38°C) o escalofríos camila    · Vómito de todo lo que usted come y jackie    · Dolor que no mejora o más alessia empeora david las siguientes 8 a 12 horas    · Desean en el vómito o heces que se mariana negras y alquitranadas    · La piel o el burgos de los ojos se vuelven amarillentos    · Grandes cantidades de sangrado vaginal que no es anthony periodo menstrual  El tratamiento para el dolor abdominal  puede llegar a incluir medicamentos para calmar anthony estómago, prevenir el vómito o disminuir el dolor  Programe margot tariq con anthony proveedor de [employer] se le haya indicado: Anote anusha preguntas para que se acuerde de hacerlas david anusha visitas  ACUERDOS SOBRE ANTHONY CUIDADO:   Usted tiene el derecho de participar en la planificación de anthony cuidado  Aprenda todo lo que pueda sobre anthony condición y wagner darle tratamiento  Discuta con anusha médicos anusha opciones de tratamiento para juntos decidir el cuidado que usted quiere recibir  Usted siempre tiene el derecho a rechazar anthony tratamiento  Esta información es sólo para uso en educación  Anthony intención no es darle un consejo médico sobre enfermedades o tratamientos   Colsulte con anthony Alben Tiago farmacéutico antes de seguir cualquier régimen médico para saber si es seguro y efectivo para usted  © 2014 [address] is for End User's use only and may not be sold, redistributed or otherwise used for commercial purposes  All illustrations and images included in CareNotes® are the copyrighted property of A D A M , Inc  or [employer]

## 2018-08-15 NOTE — ED NOTES
Patient transported to [address] [place] [address], [address] [place] [address]  08/15/18 4798

## 2018-08-16 ENCOUNTER — TELEPHONE (OUTPATIENT)
Dept: GASTROENTEROLOGY | Facility: AMBULARY SURGERY CENTER | Age: 68
End: 2018-08-16

## 2018-08-16 NOTE — TELEPHONE ENCOUNTER
NEW PT    Pt son called requesting an earlier appt for rectal bleeding and stomach pain   Pt was in the ER

## 2018-08-22 ENCOUNTER — OFFICE VISIT (OUTPATIENT)
Dept: GASTROENTEROLOGY | Facility: MEDICAL CENTER | Age: 68
End: 2018-08-22
Payer: COMMERCIAL

## 2018-08-22 VITALS
WEIGHT: 127 LBS | HEART RATE: 85 BPM | SYSTOLIC BLOOD PRESSURE: 132 MMHG | BODY MASS INDEX: 23.37 KG/M2 | TEMPERATURE: 98 F | HEIGHT: 62 IN | DIASTOLIC BLOOD PRESSURE: 70 MMHG

## 2018-08-22 DIAGNOSIS — K62.5 RECTAL BLEEDING: ICD-10-CM

## 2018-08-22 DIAGNOSIS — R10.32 LEFT LOWER QUADRANT PAIN: Primary | ICD-10-CM

## 2018-08-22 DIAGNOSIS — R93.5 ABNORMAL CT OF THE ABDOMEN: ICD-10-CM

## 2018-08-22 PROCEDURE — 99202 OFFICE O/P NEW SF 15 MIN: CPT | Performed by: INTERNAL MEDICINE

## 2018-08-22 RX ORDER — FAMOTIDINE 20 MG/1
20 TABLET, FILM COATED ORAL
COMMUNITY
Start: 2018-06-25

## 2018-08-22 RX ORDER — FLUTICASONE PROPIONATE 50 MCG
1 SPRAY, SUSPENSION (ML) NASAL
COMMUNITY
Start: 2018-06-25

## 2018-08-22 RX ORDER — LISINOPRIL 20 MG/1
20 TABLET ORAL
COMMUNITY
Start: 2018-05-24 | End: 2018-08-29

## 2018-08-22 RX ORDER — ALBUTEROL SULFATE 2.5 MG/3ML
2.5 SOLUTION RESPIRATORY (INHALATION) EVERY 4 HOURS
COMMUNITY
Start: 2018-06-25

## 2018-08-22 RX ORDER — ATORVASTATIN CALCIUM 40 MG/1
40 TABLET, FILM COATED ORAL
COMMUNITY
Start: 2018-06-25

## 2018-08-22 RX ORDER — ASPIRIN 81 MG/1
81 TABLET, CHEWABLE ORAL
COMMUNITY
Start: 2018-06-25 | End: 2018-08-29

## 2018-08-22 RX ORDER — MONTELUKAST SODIUM 10 MG/1
TABLET ORAL
COMMUNITY

## 2018-08-22 RX ORDER — FENOFIBRATE 48 MG/1
48 TABLET, COATED ORAL
COMMUNITY
Start: 2018-06-25 | End: 2018-08-29

## 2018-08-22 RX ORDER — MELOXICAM 15 MG/1
15 TABLET ORAL
COMMUNITY
Start: 2018-05-24 | End: 2019-05-24

## 2018-08-22 RX ORDER — ALBUTEROL SULFATE 90 UG/1
2 AEROSOL, METERED RESPIRATORY (INHALATION) EVERY 4 HOURS
COMMUNITY
Start: 2018-06-25

## 2018-08-22 NOTE — PROGRESS NOTES
[facility] - Outpatient Consultation  Saima Macdonald 77 y o  female MRN: [id number]  Encounter: [id number]          ASSESSMENT AND PLAN:      1  Left lower quadrant pain  - Pain is acute onset not associated with food intake  It is unlikely to be GI cause  I will refer her to see GYN to see whether the fibroid could have caused the pain  2  Rectal bleeding  Patient has ongoing rectal bleeding with hx of large colon polyps removed  Plan for repeat colonoscopy  3  Abnormal CT of the abdomen    - Ambulatory referral to Gynecology; Future    ______________________________________________________________________    HPI:      78 yo F presented for evaluation of new onset of  LLQ pain  Patient reported she has acute onset of LLQ pain last week and went to the ER for evaluation  She was found to have a  2 4 cm uterine leiomyoma suggested on the left  Patient reported her pain has subsided and reported radiates to the back and she feels numb in the back of her thigh  She had hx of large polyp removed  She had a colonoscopy 2 years ago with negative findings but she has intermittent constipation and every time she takes miralax, she noticed blood in stool  Her hgb was 11 5 with normal liver and kidney function tests  REVIEW OF SYSTEMS:    CONSTITUTIONAL: Denies any fever, chills, rigors, and weight loss  HEENT: No earache or tinnitus  Denies hearing loss or visual disturbances  CARDIOVASCULAR: No chest pain or palpitations  RESPIRATORY: Denies any cough, hemoptysis, shortness of breath or dyspnea on exertion  GASTROINTESTINAL: As noted in the History of Present Illness  GENITOURINARY: No problems with urination  Denies any hematuria or dysuria  NEUROLOGIC: No dizziness or vertigo, denies headaches  MUSCULOSKELETAL: Denies any muscle or joint pain  SKIN: Denies skin rashes or itching  ENDOCRINE: Denies excessive thirst  Denies intolerance to heat or cold    PSYCHOSOCIAL: Denies depression or anxiety  Denies any recent memory loss  Historical Information   Past Medical History:   Diagnosis Date    Asthma     Diabetes mellitus ([address] )     Hyperlipidemia     Hypertension      Past Surgical History:   Procedure Laterality Date    COLONOSCOPY      5/18/16     Social History   History   Alcohol Use    Yes     Comment: occasional     History   Drug Use No     History   Smoking Status    Light Tobacco Smoker   Smokeless Tobacco    Never Used     History reviewed  No pertinent family history  Meds/Allergies       Current Outpatient Prescriptions:     albuterol (2 5 mg/3 mL) 0 083 % nebulizer solution    albuterol (PROVENTIL HFA,VENTOLIN HFA) 90 mcg/act inhaler    aspirin (ECOTRIN LOW STRENGTH) 81 mg EC tablet    aspirin 81 mg chewable tablet    atorvastatin (LIPITOR) 10 mg tablet    atorvastatin (LIPITOR) 40 mg tablet    Blood Glucose Monitoring Suppl (ACURA BLOOD GLUCOSE METER) w/Device KIT    calcium carbonate 1250 MG capsule    dicyclomine (BENTYL) 20 mg tablet    Ergocalciferol 2000 units CAPS    famotidine (PEPCID) 20 mg tablet    Fenofibrate (TRICOR PO)    fenofibrate (TRICOR) 48 mg tablet    fluticasone (FLONASE) 50 mcg/act nasal spray    fluticasone-salmeterol (ADVAIR DISKUS) 250-50 mcg/dose inhaler    lisinopril (ZESTRIL) 20 mg tablet    lisinopril (ZESTRIL) 30 mg tablet    meloxicam (MOBIC) 15 mg tablet    metFORMIN (GLUCOPHAGE) 500 mg tablet    metFORMIN (GLUCOPHAGE) 500 mg tablet    montelukast (SINGULAIR) 10 mg tablet    sitaGLIPtin (JANUVIA) 100 mg tablet    Na Sulfate-K Sulfate-Mg Sulf (SUPREP BOWEL PREP KIT) 17 5-3 13-1 6 GM/180ML SOLN    No Known Allergies        Objective     Blood pressure 132/70, pulse 85, temperature 98 °F (36 7 °C), temperature source Tympanic, height 5' 2" (1 575 m), weight 57 6 kg (127 lb)  Body mass index is 23 23 kg/m²          PHYSICAL EXAM:      General Appearance:   Alert, cooperative, no distress   HEENT:   Normocephalic, atraumatic, anicteric      Neck:  Supple, symmetrical, trachea midline   Lungs:   Clear to auscultation bilaterally; no rales, rhonchi or wheezing; respirations unlabored    Heart[de-identified]   Regular rate and rhythm; no murmur, rub, or gallop  Abdomen:   Soft, non-tender, non-distended; normal bowel sounds; no masses, no organomegaly    Genitalia:   Deferred    Rectal:   Deferred    Extremities:  No cyanosis, clubbing or edema    Pulses:  2+ and symmetric    Skin:  No jaundice, rashes, or lesions    Lymph nodes:  No palpable cervical lymphadenopathy        Lab Results:   No visits with results within 1 Day(s) from this visit     Latest known visit with results is:   Admission on 08/15/2018, Discharged on 08/15/2018   Component Date Value    Sodium 08/15/2018 144     Potassium 08/15/2018 3 9     Chloride 08/15/2018 107     CO2 08/15/2018 30     Anion Gap 08/15/2018 7     BUN 08/15/2018 14     Creatinine 08/15/2018 0 86     Glucose 08/15/2018 154*    Calcium 08/15/2018 9 5     AST 08/15/2018 14     ALT 08/15/2018 17     Alkaline Phosphatase 08/15/2018 98     Total Protein 08/15/2018 7 3     Albumin 08/15/2018 3 7     Total Bilirubin 08/15/2018 0 20     eGFR 08/15/2018 69     WBC 08/15/2018 8 29     RBC 08/15/2018 3 81     Hemoglobin 08/15/2018 11 9     Hematocrit 08/15/2018 35 4     MCV 08/15/2018 93     MCH 08/15/2018 31 2     MCHC 08/15/2018 33 6     RDW 08/15/2018 14 0     MPV 08/15/2018 10 5     Platelets 27/76/4414 283     nRBC 08/15/2018 0     Neutrophils Relative 08/15/2018 51     Lymphocytes Relative 08/15/2018 40     Monocytes Relative 08/15/2018 6     Eosinophils Relative 08/15/2018 3     Basophils Relative 08/15/2018 0     Neutrophils Absolute 08/15/2018 4 25     Lymphocytes Absolute 08/15/2018 3 29     Monocytes Absolute 08/15/2018 0 47     Eosinophils Absolute 08/15/2018 0 26     Basophils Absolute 08/15/2018 0 02     Troponin I 08/15/2018 <0 02     Lipase 08/15/2018 164     Color, UA 08/15/2018 Yellow     Clarity, UA 08/15/2018 Clear     pH, UA 08/15/2018 5 5     Leukocytes, UA 08/15/2018 Trace*    Nitrite, UA 08/15/2018 Negative     Protein, UA 08/15/2018 Negative     Glucose, UA 08/15/2018 Negative     Ketones, UA 08/15/2018 Negative     Urobilinogen, UA 08/15/2018 0 2     Bilirubin, UA 08/15/2018 Negative     Blood, UA 08/15/2018 Trace*    Specific Gravity, UA 08/15/2018 1 025     RBC, UA 08/15/2018 None Seen     WBC, UA 08/15/2018 1-2*    Epithelial Cells 08/15/2018 Occasional     Bacteria, UA 08/15/2018 Occasional          Radiology Results:   Ct Abdomen Pelvis With Contrast    Result Date: 8/15/2018  Narrative: CT ABDOMEN AND PELVIS WITH IV CONTRAST INDICATION:   Abdominal pain  COMPARISON:  None  TECHNIQUE:  CT examination of the abdomen and pelvis was performed  Axial, sagittal, and coronal 2D reformatted images were created from the source data and submitted for interpretation  Radiation dose length product (DLP) for this visit:  244 mGy-cm   This examination, like all CT scans performed in the [facility], was performed utilizing techniques to minimize radiation dose exposure, including the use of iterative reconstruction and automated exposure control  IV Contrast:  100 mL of iohexol (OMNIPAQUE) Enteric Contrast:  Enteric contrast was not administered  FINDINGS: ABDOMEN LOWER CHEST:  No clinically significant abnormality identified in the visualized lower chest  LIVER/BILIARY TREE: Small 1 5 cm cyst is noted in the caudate lobe of the liver  One or more subcentimeter sharply circumscribed low-density hepatic lesion(s) are noted, too small to accurately characterize, but statistically most likely to represent subcentimeter hepatic cysts  No suspicious solid hepatic lesion is identified  Hepatic contours are normal   No biliary dilatation  GALLBLADDER:  No calcified gallstones  No pericholecystic inflammatory change  SPLEEN:  Unremarkable  PANCREAS:  Unremarkable  ADRENAL GLANDS:  Unremarkable  KIDNEYS/URETERS:  One or more sharply circumscribed subcentimeter renal hypodensities are noted  These lesions are too small to accurately characterize, but are statistically most likely to represent benign cortical renal cyst(s)  According to the guidelines published in the [facility] Paper of the ACR Incidental Findings Committee (Radiology 2010), no further workup of these lesions is recommended  STOMACH AND BOWEL:  Evaluation is limited without enteric contrast   There is colonic diverticulosis without acute diverticulitis  Largely debris filled stomach  No bowel obstruction  APPENDIX:  No findings to suggest appendicitis  ABDOMINOPELVIC CAVITY:  No ascites or free intraperitoneal air  No lymphadenopathy  VESSELS:  Unremarkable for patient's age  PELVIS REPRODUCTIVE ORGANS:  2 4 cm hypodense lesion in the uterus on the left, likely an underlying fibroid  URINARY BLADDER:  Unremarkable  ABDOMINAL WALL/INGUINAL REGIONS:  Unremarkable  OSSEOUS STRUCTURES:  No acute fracture or destructive osseous lesion  Impression: 1  No acute findings in the abdomen or pelvis  2  Scattered colonic diverticulosis  3   2 4 cm uterine leiomyoma suggested on the left   Workstation performed: [id number]

## 2018-08-22 NOTE — LETTER
August 22, 2018     Creighton Boxer, MD [id number] [facility]    Patient: Saima Macdonald   YOB: 1950   Date of Visit: 8/22/2018       Dear Dr Val Pathak: Thank you for referring Saima Macdonald to me for evaluation  Below are my notes for this consultation  If you have questions, please do not hesitate to call me  I look forward to following your patient along with you  Sincerely,        Evelena Phalen, MD        CC: No Recipients  Evelena Phalen, MD  8/22/2018 12:03 PM  Signed  [facility] - Outpatient Consultation  Saima Macdonald 77 y o  female MRN: [id number]  Encounter: [id number]          ASSESSMENT AND PLAN:      1  Left lower quadrant pain  - Pain is acute onset not associated with food intake  It is unlikely to be GI cause  I will refer her to see GYN to see whether the fibroid could have caused the pain  2  Rectal bleeding  Patient has ongoing rectal bleeding with hx of large colon polyps removed  Plan for repeat colonoscopy  3  Abnormal CT of the abdomen    - Ambulatory referral to Gynecology; Future    ______________________________________________________________________    HPI:      78 yo F presented for evaluation of new onset of  LLQ pain  Patient reported she has acute onset of LLQ pain last week and went to the ER for evaluation  She was found to have a  2 4 cm uterine leiomyoma suggested on the left  Patient reported her pain has subsided and reported radiates to the back and she feels numb in the back of her thigh  She had hx of large polyp removed  She had a colonoscopy 2 years ago with negative findings but she has intermittent constipation and every time she takes miralax, she noticed blood in stool  Her hgb was 11 5 with normal liver and kidney function tests  REVIEW OF SYSTEMS:    CONSTITUTIONAL: Denies any fever, chills, rigors, and weight loss  HEENT: No earache or tinnitus   Denies hearing loss or visual disturbances  CARDIOVASCULAR: No chest pain or palpitations  RESPIRATORY: Denies any cough, hemoptysis, shortness of breath or dyspnea on exertion  GASTROINTESTINAL: As noted in the History of Present Illness  GENITOURINARY: No problems with urination  Denies any hematuria or dysuria  NEUROLOGIC: No dizziness or vertigo, denies headaches  MUSCULOSKELETAL: Denies any muscle or joint pain  SKIN: Denies skin rashes or itching  ENDOCRINE: Denies excessive thirst  Denies intolerance to heat or cold  PSYCHOSOCIAL: Denies depression or anxiety  Denies any recent memory loss  Historical Information   Past Medical History:   Diagnosis Date    Asthma     Diabetes mellitus ([facility] [address] )     Hyperlipidemia     Hypertension      Past Surgical History:   Procedure Laterality Date    COLONOSCOPY      5/18/16     Social History   History   Alcohol Use    Yes     Comment: occasional     History   Drug Use No     History   Smoking Status    Light Tobacco Smoker   Smokeless Tobacco    Never Used     History reviewed  No pertinent family history      Meds/Allergies       Current Outpatient Prescriptions:     albuterol (2 5 mg/3 mL) 0 083 % nebulizer solution    albuterol (PROVENTIL HFA,VENTOLIN HFA) 90 mcg/act inhaler    aspirin (ECOTRIN LOW STRENGTH) 81 mg EC tablet    aspirin 81 mg chewable tablet    atorvastatin (LIPITOR) 10 mg tablet    atorvastatin (LIPITOR) 40 mg tablet    Blood Glucose Monitoring Suppl (ACURA BLOOD GLUCOSE METER) w/Device KIT    calcium carbonate 1250 MG capsule    dicyclomine (BENTYL) 20 mg tablet    Ergocalciferol 2000 units CAPS    famotidine (PEPCID) 20 mg tablet    Fenofibrate (TRICOR PO)    fenofibrate (TRICOR) 48 mg tablet    fluticasone (FLONASE) 50 mcg/act nasal spray    fluticasone-salmeterol (ADVAIR DISKUS) 250-50 mcg/dose inhaler    lisinopril (ZESTRIL) 20 mg tablet    lisinopril (ZESTRIL) 30 mg tablet    meloxicam (MOBIC) 15 mg tablet   metFORMIN (GLUCOPHAGE) 500 mg tablet    metFORMIN (GLUCOPHAGE) 500 mg tablet    montelukast (SINGULAIR) 10 mg tablet    sitaGLIPtin (JANUVIA) 100 mg tablet    Na Sulfate-K Sulfate-Mg Sulf (SUPREP BOWEL PREP KIT) 17 5-3 13-1 6 GM/180ML SOLN    No Known Allergies        Objective     Blood pressure 132/70, pulse 85, temperature 98 °F (36 7 °C), temperature source Tympanic, height 5' 2" (1 575 m), weight 57 6 kg (127 lb)  Body mass index is 23 23 kg/m²  PHYSICAL EXAM:      General Appearance:   Alert, cooperative, no distress   HEENT:   Normocephalic, atraumatic, anicteric      Neck:  Supple, symmetrical, trachea midline   Lungs:   Clear to auscultation bilaterally; no rales, rhonchi or wheezing; respirations unlabored    Heart[de-identified]   Regular rate and rhythm; no murmur, rub, or gallop  Abdomen:   Soft, non-tender, non-distended; normal bowel sounds; no masses, no organomegaly    Genitalia:   Deferred    Rectal:   Deferred    Extremities:  No cyanosis, clubbing or edema    Pulses:  2+ and symmetric    Skin:  No jaundice, rashes, or lesions    Lymph nodes:  No palpable cervical lymphadenopathy        Lab Results:   No visits with results within 1 Day(s) from this visit     Latest known visit with results is:   Admission on 08/15/2018, Discharged on 08/15/2018   Component Date Value    Sodium 08/15/2018 144     Potassium 08/15/2018 3 9     Chloride 08/15/2018 107     CO2 08/15/2018 30     Anion Gap 08/15/2018 7     BUN 08/15/2018 14     Creatinine 08/15/2018 0 86     Glucose 08/15/2018 154*    Calcium 08/15/2018 9 5     AST 08/15/2018 14     ALT 08/15/2018 17     Alkaline Phosphatase 08/15/2018 98     Total Protein 08/15/2018 7 3     Albumin 08/15/2018 3 7     Total Bilirubin 08/15/2018 0 20     eGFR 08/15/2018 69     WBC 08/15/2018 8 29     RBC 08/15/2018 3 81     Hemoglobin 08/15/2018 11 9     Hematocrit 08/15/2018 35 4     MCV 08/15/2018 93     MCH 08/15/2018 31 2     MCHC 08/15/2018 33 6     RDW 08/15/2018 14 0     MPV 08/15/2018 10 5     Platelets 47/68/9078 283     nRBC 08/15/2018 0     Neutrophils Relative 08/15/2018 51     Lymphocytes Relative 08/15/2018 40     Monocytes Relative 08/15/2018 6     Eosinophils Relative 08/15/2018 3     Basophils Relative 08/15/2018 0     Neutrophils Absolute 08/15/2018 4 25     Lymphocytes Absolute 08/15/2018 3 29     Monocytes Absolute 08/15/2018 0 47     Eosinophils Absolute 08/15/2018 0 26     Basophils Absolute 08/15/2018 0 02     Troponin I 08/15/2018 <0 02     Lipase 08/15/2018 164     Color, UA 08/15/2018 Yellow     Clarity, UA 08/15/2018 Clear     pH, UA 08/15/2018 5 5     Leukocytes, UA 08/15/2018 Trace*    Nitrite, UA 08/15/2018 Negative     Protein, UA 08/15/2018 Negative     Glucose, UA 08/15/2018 Negative     Ketones, UA 08/15/2018 Negative     Urobilinogen, UA 08/15/2018 0 2     Bilirubin, UA 08/15/2018 Negative     Blood, UA 08/15/2018 Trace*    Specific Gravity, UA 08/15/2018 1 025     RBC, UA 08/15/2018 None Seen     WBC, UA 08/15/2018 1-2*    Epithelial Cells 08/15/2018 Occasional     Bacteria, UA 08/15/2018 Occasional          Radiology Results:   Ct Abdomen Pelvis With Contrast    Result Date: 8/15/2018  Narrative: CT ABDOMEN AND PELVIS WITH IV CONTRAST INDICATION:   Abdominal pain  COMPARISON:  None  TECHNIQUE:  CT examination of the abdomen and pelvis was performed  Axial, sagittal, and coronal 2D reformatted images were created from the source data and submitted for interpretation  Radiation dose length product (DLP) for this visit:  244 mGy-cm   This examination, like all CT scans performed in the [facility], was performed utilizing techniques to minimize radiation dose exposure, including the use of iterative reconstruction and automated exposure control  IV Contrast:  100 mL of iohexol (OMNIPAQUE) Enteric Contrast:  Enteric contrast was not administered   FINDINGS: ABDOMEN LOWER CHEST: No clinically significant abnormality identified in the visualized lower chest  LIVER/BILIARY TREE: Small 1 5 cm cyst is noted in the caudate lobe of the liver  One or more subcentimeter sharply circumscribed low-density hepatic lesion(s) are noted, too small to accurately characterize, but statistically most likely to represent subcentimeter hepatic cysts  No suspicious solid hepatic lesion is identified  Hepatic contours are normal   No biliary dilatation  GALLBLADDER:  No calcified gallstones  No pericholecystic inflammatory change  SPLEEN:  Unremarkable  PANCREAS:  Unremarkable  ADRENAL GLANDS:  Unremarkable  KIDNEYS/URETERS:  One or more sharply circumscribed subcentimeter renal hypodensities are noted  These lesions are too small to accurately characterize, but are statistically most likely to represent benign cortical renal cyst(s)  According to the guidelines published in the [place] Paper of the ACR Incidental Findings Committee (Radiology 2010), no further workup of these lesions is recommended  STOMACH AND BOWEL:  Evaluation is limited without enteric contrast   There is colonic diverticulosis without acute diverticulitis  Largely debris filled stomach  No bowel obstruction  APPENDIX:  No findings to suggest appendicitis  ABDOMINOPELVIC CAVITY:  No ascites or free intraperitoneal air  No lymphadenopathy  VESSELS:  Unremarkable for patient's age  PELVIS REPRODUCTIVE ORGANS:  2 4 cm hypodense lesion in the uterus on the left, likely an underlying fibroid  URINARY BLADDER:  Unremarkable  ABDOMINAL WALL/INGUINAL REGIONS:  Unremarkable  OSSEOUS STRUCTURES:  No acute fracture or destructive osseous lesion  Impression: 1  No acute findings in the abdomen or pelvis  2  Scattered colonic diverticulosis  3   2 4 cm uterine leiomyoma suggested on the left   Workstation performed: [id number]

## 2018-08-27 ENCOUNTER — TELEPHONE (OUTPATIENT)
Dept: GASTROENTEROLOGY | Facility: AMBULARY SURGERY CENTER | Age: 68
End: 2018-08-27

## 2018-08-27 NOTE — TELEPHONE ENCOUNTER
Dr Meraz's pt called wanting to sched her EGD the same day as her Colonoscopy that is sched for 09/07/18  Please call pt to sched   [place] reached at [phone] [address]

## 2018-08-27 NOTE — TELEPHONE ENCOUNTER
Dr Mreaz,    Pt requesting to schedule EGD on same day of colon  Is it ok to schedule EGD? Please Advise  Thanks!

## 2018-08-28 NOTE — TELEPHONE ENCOUNTER
Felicia Grant reached out to pt she states she having abd pain, pain in the esophagus, chest pain  Is ok to add EGD? Please Advise  Thanks!

## 2018-08-29 ENCOUNTER — HOSPITAL ENCOUNTER (EMERGENCY)
Facility: HOSPITAL | Age: 68
Discharge: HOME | End: 2018-08-29
Attending: EMERGENCY MEDICINE
Payer: COMMERCIAL

## 2018-08-29 VITALS
SYSTOLIC BLOOD PRESSURE: 146 MMHG | DIASTOLIC BLOOD PRESSURE: 77 MMHG | TEMPERATURE: 97.5 F | OXYGEN SATURATION: 95 % | HEART RATE: 89 BPM | RESPIRATION RATE: 18 BRPM | WEIGHT: 125 LBS | BODY MASS INDEX: 22.86 KG/M2

## 2018-08-29 DIAGNOSIS — B02.9 HERPES ZOSTER WITHOUT COMPLICATION: Primary | ICD-10-CM

## 2018-08-29 PROCEDURE — 99282 EMERGENCY DEPT VISIT SF MDM: CPT

## 2018-08-29 RX ORDER — OXYCODONE HYDROCHLORIDE AND ACETAMINOPHEN 5; 325 MG/1; MG/1
1 TABLET ORAL ONCE
Status: COMPLETED | OUTPATIENT
Start: 2018-08-29 | End: 2018-08-29

## 2018-08-29 RX ORDER — OXYCODONE HYDROCHLORIDE AND ACETAMINOPHEN 5; 325 MG/1; MG/1
1 TABLET ORAL EVERY 6 HOURS PRN
Qty: 20 TABLET | Refills: 0 | Status: SHIPPED | OUTPATIENT
Start: 2018-08-29 | End: 2018-09-03

## 2018-08-29 RX ORDER — OXYCODONE HYDROCHLORIDE AND ACETAMINOPHEN 5; 325 MG/1; MG/1
TABLET ORAL
Status: COMPLETED
Start: 2018-08-29 | End: 2018-08-29

## 2018-08-29 RX ADMIN — OXYCODONE HYDROCHLORIDE AND ACETAMINOPHEN 1 TABLET: 5; 325 TABLET ORAL at 10:06

## 2018-08-29 NOTE — DISCHARGE INSTRUCTIONS
Culebrilla   CUIDADO AMBULATORIO:   Culebrilla  es un sarpullido doloroso  La culebrilla es causada por el mismo virus que causa la varicela (virus varicela-zóster)  Después de contagiarse con varicela, el virus permanece en cabrera cuerpo por varios años sin causar ningún síntoma  La culebrilla ocurre cuando el virus se activa nuevamente  Aury Ray que se activa, el virus viaja por un nervio hasta cabrera piel y provoca un sarpullido  Los signos y síntomas más comunes incluyen los siguientes:  La culebrilla usualmente comienza con dolor en la espalda, pecho, karri o talat  Luego, un sarpullido se desarrolla en la misma área  El sarpullido usualmente se encuentra en un lado de cabrera cuerpo solamente  Usted podría sentir picazón o dolor donde se presenta el sarpullido  El sarpullido comienza wagner puntos rojos que se convierten en ampollas llenas de líquido  Las [employer] crecen, se llenan de pus y domenico costras después de varios días  Usted también podría presentar alguno de los siguientes:  · Cansancio y debilidad muscular    · Dolor cuando toca cabrera piel ligeramente    · Dolor de doretha    · Vilma Ness    · Dolor en los ojos cuando son expuestos a la myranda  Busque atención médica de inmediato si:   · La piel alrededor de las ampollas está adolorida, enrojecida y caliente o las ampollas drenan pus  · Usted tiene rigidez en el karri o dificultad para moverlo  · Usted tiene dificultad para  anusha brazos, piernas o talat  · Usted sufre margot convulsión  · Usted tiene debilidad en un brazo o en margot pierna  · Usted se siente confundido o tiene dificultad para hablar  · Usted se siente mareado, tiene dolor de [place] severo o pérdida de audición o visión  Pregúntele a cabrera Kristin Byes vitaminas y minerales son adecuados para usted  · Usted se siente débil o tiene dolor de [place]  · Usted tiene tos, escalofríos o fiebre      · Usted tiene dolor abdominal o náuseas o está vomitando  · El sarpullido le causa más picazón o dolor  · El sarpullido se propaga a otras partes de cabrera cuerpo  · Cabrera dolor empeora y no desaparece aún después de kenneth medicamento  · Usted tiene preguntas o inquietudes acerca de cabrera condición o cuidado  Medicamentos:   · Medicamentos antivirales  ayudan a reducir los síntomas y el tiempo de recuperación  También podrían reducir cabrera riesgo de dolor de [place]  Para prevenir el dolor de nervio, es necesario kenneth filomena medicamento dentro de los primeros 3 sam del inicio de los síntomas  · Los analgésicos  pueden ser recetados o sugeridos por cabrera médico  Dependiendo de la severidad de cabrera dolor, usted podría necesitar medicamentos antiinflamatorios para el dolor, acetaminofén u opiáceos  No espere a que cabrera dolor sea severo para kenneth más medicamentos para el dolor  · Los anestésicos tópicos  se usan para entumecer la piel y reducir el dolor  Son disponibles en forma de crema, gel, aerosol o un parche  · Los anticonvulsivos  reducen el dolor de nervio y podrían facilitar cabrera habilidad para dormir  · Antidepresivos  se usan para reducir el dolor de nervio  Acuda a anusha consultas de control con cabrera médico según le indicaron  Anote anusha preguntas para que se acuerde de hacerlas david anusha visitas  Cuidados personales:  Mantenga el sarpullido limpio y [address] sarpullido con un vendaje o ropa  No utilice vendajes que se pegan a la piel  La parte pegajosa podría irritar la piel y prolongar el sarpullido  Prevenga la propagación de la culebrilla:  Es posible transmitir el virus a margot persona que nunca ha tenido varicela  Margot vez expuesto al virus, esta persona puede contraer la varicela, francisco no culebrilla  Es posible transmitir el virus a otras personas mientras tiene el sarpullido  El virus se transmite por contacto directo con el líquido de las [place]  Por lo general, no es posible transmitir el virus margot vez que se sequen las ampollas  Prevenga la culebrilla u otro brote de culebrilla:  Es posible que se administre margot vacuna para facilitar la prevención de la culebrilla  Pida más información acerca de esta vacuna  © 2017 [address] [employer] Information is for End User's use only and may not be sold, redistributed or otherwise used for commercial purposes  All illustrations and images included in CareNotes® are the copyrighted property of A D A M , Inc  or Pb Lyle  Esta información es sólo para uso en educación  Collado intención no es darle un consejo médico sobre enfermedades o tratamientos  Colsulte con collado Reanna Kind farmacéutico antes de seguir cualquier régimen médico para saber si es seguro y efectivo para usted

## 2018-08-29 NOTE — TELEPHONE ENCOUNTER
I spoke with pt she states she needs to cancel procedure because she has shingles & is in a lot of pain  Pt will call back to reschedule

## 2018-08-29 NOTE — ED PROVIDER NOTES
History  Chief Complaint   Patient presents with    Rash     "I have a rash on my left side and leg for almost 2 weeks now and it hurts, sometimes it hurts too much that I can't sleep"  (Appears to be shingles)       History provided by:  Patient   used: Yes    Rash   Location: left L1 dermatome  Quality: painful    Quality comment:  Few scattered vesicles with scab formation  Pain details:     Quality:  Numbness and stinging    Severity:  Severe    Onset quality:  Gradual    Duration:  2 weeks    Timing:  Constant    Progression:  Worsening  Severity:  Mild  Onset quality:  Gradual  Duration:  2 weeks  Timing:  Constant  Progression:  Worsening  Chronicity:  New  Context comment:  Received shingles vaccine about 2 years ago  Does not recall history of chicken pox during childhood  Relieved by:  OTC analgesics  Worsened by:  Contact  Ineffective treatments:  OTC analgesics  Associated symptoms: no abdominal pain, no diarrhea, no fatigue, no fever, no headaches, no hoarse voice, no induration, no joint pain, no myalgias, no nausea, no shortness of breath, no sore throat, no URI and not vomiting        Prior to Admission Medications   Prescriptions Last Dose Informant Patient Reported? Taking?    Blood Glucose Monitoring Suppl (ACURA BLOOD GLUCOSE METER) w/Device KIT   Yes No   Sig: Use to test blood sugars four times daily   Ergocalciferol 2000 units CAPS   Yes No   Sig: Take 50,000 Units by mouth   FENOFIBRATE PO   Yes Yes   Sig: Take by mouth   LISINOPRIL PO   Yes Yes   Sig: Take by mouth   Na Sulfate-K Sulfate-Mg Sulf (SUPREP BOWEL PREP KIT) 17 5-3 13-1 6 GM/180ML SOLN   No No   Sig: Take 1 Bottle by mouth once for 1 dose   albuterol (2 5 mg/3 mL) 0 083 % nebulizer solution   Yes No   Sig: Inhale 2 5 mg every 4 (four) hours   albuterol (PROVENTIL HFA,VENTOLIN HFA) 90 mcg/act inhaler   Yes No   Sig: Inhale 2 puffs every 4 (four) hours   aspirin (ECOTRIN LOW STRENGTH) 81 mg EC tablet   Yes No Sig: Take 81 mg by mouth daily   atorvastatin (LIPITOR) 40 mg tablet   Yes No   Sig: Take 40 mg by mouth   calcium carbonate 1250 MG capsule   Yes No   Sig: daily   dicyclomine (BENTYL) 20 mg tablet   No No   Sig: Take 1 tablet (20 mg total) by mouth every 6 (six) hours as needed (abd pain)   famotidine (PEPCID) 20 mg tablet   Yes No   Sig: Take 20 mg by mouth   fluticasone (FLONASE) 50 mcg/act nasal spray   Yes No   Si spray into each nostril   fluticasone-salmeterol (ADVAIR DISKUS) 250-50 mcg/dose inhaler   Yes No   Sig: Inhale 1 puff   meloxicam (MOBIC) 15 mg tablet   Yes No   Sig: Take 15 mg by mouth   metFORMIN (GLUCOPHAGE) 500 mg tablet   Yes No   Sig: Take 500 mg by mouth daily with breakfast   montelukast (SINGULAIR) 10 mg tablet   Yes No   Sig: Take by mouth   sitaGLIPtin (JANUVIA) 100 mg tablet   Yes No   Sig: Take 100 mg by mouth      Facility-Administered Medications: None       Past Medical History:   Diagnosis Date    Asthma     Diabetes mellitus ([facility] [address] )     Hyperlipidemia     Hypertension        Past Surgical History:   Procedure Laterality Date    COLONOSCOPY      16    TUBAL LIGATION         History reviewed  No pertinent family history  I have reviewed and agree with the history as documented  Social History   Substance Use Topics    Smoking status: Light Tobacco Smoker    Smokeless tobacco: Never Used    Alcohol use Yes      Comment: occasional        Review of Systems   Constitutional: Negative for chills, fatigue and fever  HENT: Negative for hoarse voice and sore throat  Respiratory: Negative for shortness of breath  Gastrointestinal: Negative for abdominal pain, diarrhea, nausea and vomiting  Musculoskeletal: Negative for arthralgias and myalgias  Skin: Positive for rash  Negative for color change, pallor and wound  Neurological: Positive for numbness  Negative for weakness and headaches  All other systems reviewed and are negative        Physical Exam  ED Triage Vitals   Temperature Pulse Respirations Blood Pressure SpO2   08/29/18 0932 08/29/18 0932 08/29/18 0932 08/29/18 0932 08/29/18 0932   97 5 °F (36 4 °C) 89 18 146/77 95 %      Temp src Heart Rate Source Patient Position - Orthostatic VS BP Location FiO2 (%)   -- -- -- -- --             Pain Score       08/29/18 1006       Worst Possible Pain           Orthostatic Vital Signs  Vitals:    08/29/18 0932   BP: 146/77   Pulse: 89       Physical Exam   Constitutional: She is oriented to person, place, and time  Vital signs are normal  She appears well-developed and well-nourished  She does not appear ill  No distress  HENT:   Head: Normocephalic and atraumatic  Nose: Nose normal    Mouth/Throat: Oropharynx is clear and moist    Neck: Normal range of motion  Cardiovascular: Normal rate  Pulmonary/Chest: Effort normal    Abdominal: Normal appearance  Musculoskeletal: Normal range of motion  She exhibits no edema, tenderness or deformity  Neurological: She is alert and oriented to person, place, and time  She has normal strength  No sensory deficit  She exhibits normal muscle tone  Gait normal    Skin: Skin is warm and dry  She is not diaphoretic  No crepitus or appearance of cellulitis  Psychiatric: She has a normal mood and affect  Her behavior is normal  Judgment and thought content normal    Nursing note and vitals reviewed  ED Medications  Medications   oxyCODONE-acetaminophen (PERCOCET) 5-325 mg per tablet 1 tablet (1 tablet Oral Given 8/29/18 1006)       Diagnostic Studies  Results Reviewed     None                 No orders to display         Procedures  Procedures      Phone Consults  ED Phone Contact    ED Course                               MDM  Number of Diagnoses or Management Options  Herpes zoster without complication:   Diagnosis management comments: Rash with vesicular appearance limited to left L1 dermatome  Appears to be Herpes zoster   Contact dermatitis unlikely due to severe pain and one sided dermatome apperance  Present for two weeks - out of antiviral treatment window  Pain interfering with sleep  Pain control prescribed  Percocet 5/325 Q6H as needed for pain up to 5 days  Advised patient not to take other pain medication in combination  Explained risk of constipation and advised to continue with stool softener  Explained risk of postherpetic pain, and that she is at increased risk of prolonged pain duration due to her age  All questions answered  Patient advised to follow-up with PCP  Risk of Complications, Morbidity, and/or Mortality  Presenting problems: moderate  Diagnostic procedures: minimal  Management options: low    Patient Progress  Patient progress: stable    CritCare Time    Disposition  Final diagnoses:   Herpes zoster without complication     Time reflects when diagnosis was documented in both MDM as applicable and the Disposition within this note     Time User Action Codes Description Comment    8/29/2018 10:01 AM Chelsey Tavera Add [B02 9] Herpes zoster without complication       ED Disposition     ED Disposition Condition Comment    Discharge  Nakia Wesleyjosé miguel discharge to home/self care  Condition at discharge: Stable        Follow-up Information     Follow up With Specialties Details Why Contact Info    Jennifer Lopez MD Family Medicine  Follow-up for management of shingles symptoms   [address]  [address] [facility] [place] [address]            Discharge Medication List as of 8/29/2018 10:08 AM      START taking these medications    Details   oxyCODONE-acetaminophen (PERCOCET) 5-325 mg per tablet Take 1 tablet by mouth every 6 (six) hours as needed for moderate pain for up to 5 days Max Daily Amount: 4 tablets, Starting Wed 8/29/2018, Until Mon 9/3/2018, Print         CONTINUE these medications which have NOT CHANGED    Details   FENOFIBRATE PO Take by mouth, Historical Med      LISINOPRIL PO Take by mouth, Historical Med      albuterol (2 5 mg/3 mL) 0 083 % nebulizer solution Inhale 2 5 mg every 4 (four) hours, Starting Mon 6/25/2018, Historical Med      albuterol (PROVENTIL HFA,VENTOLIN HFA) 90 mcg/act inhaler Inhale 2 puffs every 4 (four) hours, Starting Mon 6/25/2018, Historical Med      aspirin (ECOTRIN LOW STRENGTH) 81 mg EC tablet Take 81 mg by mouth daily, Historical Med      atorvastatin (LIPITOR) 40 mg tablet Take 40 mg by mouth, Starting Mon 6/25/2018, Historical Med      Blood Glucose Monitoring Suppl (ACURA BLOOD GLUCOSE METER) w/Device KIT Use to test blood sugars four times daily, Historical Med      calcium carbonate 1250 MG capsule daily, Historical Med      dicyclomine (BENTYL) 20 mg tablet Take 1 tablet (20 mg total) by mouth every 6 (six) hours as needed (abd pain), Starting Wed 8/15/2018, Print      Ergocalciferol 2000 units CAPS Take 50,000 Units by mouth, Starting Mon 6/25/2018, Until Mon 9/17/2018, Historical Med      famotidine (PEPCID) 20 mg tablet Take 20 mg by mouth, Starting Mon 6/25/2018, Historical Med      fluticasone (FLONASE) 50 mcg/act nasal spray 1 spray into each nostril, Starting Mon 6/25/2018, Historical Med      fluticasone-salmeterol (ADVAIR DISKUS) 250-50 mcg/dose inhaler Inhale 1 puff, Starting Thu 5/24/2018, Historical Med      meloxicam (MOBIC) 15 mg tablet Take 15 mg by mouth, Starting Thu 5/24/2018, Until Fri 5/24/2019, Historical Med      metFORMIN (GLUCOPHAGE) 500 mg tablet Take 500 mg by mouth daily with breakfast, Historical Med      montelukast (SINGULAIR) 10 mg tablet Take by mouth, Historical Med      Na Sulfate-K Sulfate-Mg Sulf (SUPREP BOWEL PREP KIT) 17 5-3 13-1 6 GM/180ML SOLN Take 1 Bottle by mouth once for 1 dose, Starting Wed 8/22/2018, Normal      sitaGLIPtin (JANUVIA) 100 mg tablet Take 100 mg by mouth, Starting Thu 5/24/2018, Historical Med           No discharge procedures on file  ED Provider  Attending physically available and evaluated Loura Sandifer I managed the patient along with the ED Attending      Electronically Signed by         Michela Nguyen MD  08/29/18 Maurisio Cuellar MD  08/29/18 [id number]

## 2018-09-18 ENCOUNTER — OFFICE VISIT (OUTPATIENT)
Dept: OBGYN CLINIC | Facility: MEDICAL CENTER | Age: 68
End: 2018-09-18
Payer: COMMERCIAL

## 2018-09-18 VITALS
BODY MASS INDEX: 23.65 KG/M2 | HEIGHT: 62 IN | WEIGHT: 128.5 LBS | SYSTOLIC BLOOD PRESSURE: 120 MMHG | DIASTOLIC BLOOD PRESSURE: 82 MMHG

## 2018-09-18 DIAGNOSIS — R10.32 LEFT LOWER QUADRANT PAIN: Primary | ICD-10-CM

## 2018-09-18 DIAGNOSIS — K57.91 DIVERTICULOSIS OF INTESTINE WITH BLEEDING, UNSPECIFIED INTESTINAL TRACT LOCATION: ICD-10-CM

## 2018-09-18 PROCEDURE — 99203 OFFICE O/P NEW LOW 30 MIN: CPT | Performed by: OBSTETRICS & GYNECOLOGY

## 2018-09-18 RX ORDER — GABAPENTIN 300 MG/1
300 CAPSULE ORAL
COMMUNITY
Start: 2018-08-31 | End: 2019-08-31

## 2018-09-18 NOTE — PROGRESS NOTES
Guerry Soulier was seen today for er follow-up  Diagnoses and all orders for this visit:    Left lower quadrant pain  -     Ambulatory referral to Gastroenterology; Future    Diverticulosis of intestine with bleeding, unspecified intestinal tract location  -     Ambulatory referral to Gastroenterology; Future         Plan: today we reviewed CT scan from 8/15/18  Informed patient of findings  Aware small 2 5cm fibroid which is unlikely the cause of her 3 years of LLQ pain  From hx patient has a lot of GI symptoms which suggest more a GI etiology for pain  Referral made for evaluation     Subjective   Veto Solis is a 77 y o  female here for a problem visit  Patient is complaining of LLQ she has had for 3 years  States she is a  last seen by GYN 3 years ago  She is postmenopausal and has never had any vaginal bleeding  States she does have pain with BM and at times bleeding from rectum   Patient Active Problem List   Diagnosis    Rectal bleeding    Left lower quadrant pain       Gynecologic History  No LMP recorded (lmp unknown)  Patient is postmenopausal     Past Medical History:   Diagnosis Date    Asthma     Diabetes mellitus ([facility] [address] )     Hyperlipidemia     Hypertension      Past Surgical History:   Procedure Laterality Date    COLONOSCOPY      16    TUBAL LIGATION       History reviewed  No pertinent family history  Social History     Social History    Marital status: Single     Spouse name: N/A    Number of children: N/A    Years of education: N/A     Occupational History    Not on file       Social History Main Topics    Smoking status: Light Tobacco Smoker    Smokeless tobacco: Never Used    Alcohol use Yes      Comment: occasional    Drug use: No    Sexual activity: Not on file     Other Topics Concern    Not on file     Social History Narrative    No narrative on file     No Known Allergies    Current Outpatient Prescriptions:     gabapentin (NEURONTIN) 300 mg capsule, Take 300 mg by mouth, Disp: , Rfl:     albuterol (2 5 mg/3 mL) 0 083 % nebulizer solution, Inhale 2 5 mg every 4 (four) hours, Disp: , Rfl:     albuterol (PROVENTIL HFA,VENTOLIN HFA) 90 mcg/act inhaler, Inhale 2 puffs every 4 (four) hours, Disp: , Rfl:     aspirin (ECOTRIN LOW STRENGTH) 81 mg EC tablet, Take 81 mg by mouth daily, Disp: , Rfl:     atorvastatin (LIPITOR) 40 mg tablet, Take 40 mg by mouth, Disp: , Rfl:     Blood Glucose Monitoring Suppl (ACURA BLOOD GLUCOSE METER) w/Device KIT, Use to test blood sugars four times daily, Disp: , Rfl:     calcium carbonate 1250 MG capsule, daily, Disp: , Rfl:     dicyclomine (BENTYL) 20 mg tablet, Take 1 tablet (20 mg total) by mouth every 6 (six) hours as needed (abd pain), Disp: 20 tablet, Rfl: 0    famotidine (PEPCID) 20 mg tablet, Take 20 mg by mouth, Disp: , Rfl:     FENOFIBRATE PO, Take by mouth, Disp: , Rfl:     fluticasone (FLONASE) 50 mcg/act nasal spray, 1 spray into each nostril, Disp: , Rfl:     fluticasone-salmeterol (ADVAIR DISKUS) 250-50 mcg/dose inhaler, Inhale 1 puff, Disp: , Rfl:     LISINOPRIL PO, Take by mouth, Disp: , Rfl:     meloxicam (MOBIC) 15 mg tablet, Take 15 mg by mouth, Disp: , Rfl:     metFORMIN (GLUCOPHAGE) 500 mg tablet, Take 500 mg by mouth daily with breakfast, Disp: , Rfl:     montelukast (SINGULAIR) 10 mg tablet, Take by mouth, Disp: , Rfl:     Na Sulfate-K Sulfate-Mg Sulf (SUPREP BOWEL PREP KIT) 17 5-3 13-1 6 GM/180ML SOLN, Take 1 Bottle by mouth once for 1 dose, Disp: 1 Bottle, Rfl: 0    sitaGLIPtin (JANUVIA) 100 mg tablet, Take 100 mg by mouth, Disp: , Rfl:     Review of Systems  Constitutional :no fever, feels well, no tiredness, no recent weight gain or loss  ENT: no ear ache, no loss of hearing, no nosebleeds or nasal discharge, no sore throat or hoarseness  Cardiovascular: no complaints of slow or fast heart beat, no chest pain, no palpitations, no leg claudication or lower extremity edema    Respiratory: no complaints of shortness of shortness of breath, no CHAIDEZ  Breasts:no complaints of breast pain, breast lump, or nipple discharge  Gastrointestinal:as noted in HPI  Genitourinary : no complaints of dysuria, incontinence, pelvic pain, no dysmenorrhea, vaginal discharge or abnormal vaginal bleeding and as noted in HPI  Musculoskeletal: no complaints of arthralgia, no myalgia, no joint swelling or stiffness, no limb pain or swelling  Integumentary: no complaints of skin rash or lesion, itching or dry skin  Neurological: no complaints of headache, no confusion, no numbness or tingling, no dizziness or fainting     Objective     /82   Ht 5' 1 5" (1 562 m)   Wt 58 3 kg (128 lb 8 oz)   LMP  (LMP Unknown)   Breastfeeding? No   BMI 23 89 kg/m²     General:   appears stated age, cooperative, alert normal mood and affect   Heart: regular rate and rhythm, S1, S2 normal, no murmur, click, rub or gallop   Lungs: clear to auscultation bilaterally   Abdomen: soft, non-tender, without masses or organomegaly   Vulva: normal, Bartholin's, Urethra, [place] normal, normal female hair distribution   Vagina: normal vagina, no discharge, exudate, lesion, or erythema   Urethra: normal   Cervix: Normal, no discharge  Nontender     Uterus: normal size, contour, position, consistency, mobility, non-tender   Adnexa: no mass, fullness, tenderness   Psychiatric orientation to person, place, and time: normal  mood and affect: normal

## 2018-10-09 DIAGNOSIS — K62.5 RECTAL BLEEDING: ICD-10-CM

## 2018-10-09 DIAGNOSIS — R10.32 LEFT LOWER QUADRANT PAIN: ICD-10-CM

## 2018-10-09 PROBLEM — R07.9 CHEST PAIN: Status: ACTIVE | Noted: 2018-10-09

## 2018-10-09 PROBLEM — R93.5 ABNORMAL CT OF THE ABDOMEN: Status: ACTIVE | Noted: 2018-10-09

## 2018-10-09 NOTE — TELEPHONE ENCOUNTER
The pt stopped by the office to schedule her colon/egd with Dr Cheyenne Singh  She is scheduled on 10/22/18 at [place] [id number]  Suprep instructions were gone over with her and it will be called into her pharmacy  She is aware she will be called the day prior with an arrival time

## 2018-10-19 ENCOUNTER — ANESTHESIA EVENT (OUTPATIENT)
Dept: GASTROENTEROLOGY | Facility: MEDICAL CENTER | Age: 68
End: 2018-10-19
Payer: COMMERCIAL

## 2018-10-22 ENCOUNTER — HOSPITAL ENCOUNTER (OUTPATIENT)
Facility: MEDICAL CENTER | Age: 68
Setting detail: OUTPATIENT SURGERY
Discharge: HOME | End: 2018-10-22
Attending: INTERNAL MEDICINE | Admitting: INTERNAL MEDICINE
Payer: COMMERCIAL

## 2018-10-22 ENCOUNTER — ANESTHESIA (OUTPATIENT)
Dept: GASTROENTEROLOGY | Facility: MEDICAL CENTER | Age: 68
End: 2018-10-22
Payer: COMMERCIAL

## 2018-10-22 VITALS
TEMPERATURE: 97.5 F | RESPIRATION RATE: 16 BRPM | OXYGEN SATURATION: 98 % | DIASTOLIC BLOOD PRESSURE: 70 MMHG | WEIGHT: 129 LBS | SYSTOLIC BLOOD PRESSURE: 110 MMHG | HEIGHT: 62 IN | HEART RATE: 75 BPM | BODY MASS INDEX: 23.74 KG/M2

## 2018-10-22 DIAGNOSIS — R93.5 ABNORMAL CT OF THE ABDOMEN: ICD-10-CM

## 2018-10-22 DIAGNOSIS — K62.5 RECTAL BLEED: ICD-10-CM

## 2018-10-22 DIAGNOSIS — R07.9 CHEST PAIN, UNSPECIFIED TYPE: ICD-10-CM

## 2018-10-22 LAB
GLUCOSE SERPL-MCNC: 144 MG/DL (ref 65–140)
GLUCOSE SERPL-MCNC: 161 MG/DL (ref 65–140)

## 2018-10-22 PROCEDURE — 82948 REAGENT STRIP/BLOOD GLUCOSE: CPT

## 2018-10-22 PROCEDURE — 88313 SPECIAL STAINS GROUP 2: CPT | Performed by: PATHOLOGY

## 2018-10-22 PROCEDURE — 88342 IMHCHEM/IMCYTCHM 1ST ANTB: CPT | Performed by: PATHOLOGY

## 2018-10-22 PROCEDURE — 88305 TISSUE EXAM BY PATHOLOGIST: CPT | Performed by: PATHOLOGY

## 2018-10-22 PROCEDURE — 45380 COLONOSCOPY AND BIOPSY: CPT | Performed by: INTERNAL MEDICINE

## 2018-10-22 PROCEDURE — 88341 IMHCHEM/IMCYTCHM EA ADD ANTB: CPT | Performed by: PATHOLOGY

## 2018-10-22 PROCEDURE — 45381 COLONOSCOPY SUBMUCOUS NJX: CPT | Performed by: INTERNAL MEDICINE

## 2018-10-22 PROCEDURE — 43239 EGD BIOPSY SINGLE/MULTIPLE: CPT | Performed by: INTERNAL MEDICINE

## 2018-10-22 RX ORDER — LIDOCAINE HYDROCHLORIDE 20 MG/ML
INJECTION, SOLUTION EPIDURAL; INFILTRATION; INTRACAUDAL; PERINEURAL AS NEEDED
Status: DISCONTINUED | OUTPATIENT
Start: 2018-10-22 | End: 2018-10-22 | Stop reason: SURG

## 2018-10-22 RX ORDER — SODIUM CHLORIDE 9 MG/ML
125 INJECTION, SOLUTION INTRAVENOUS CONTINUOUS
Status: DISCONTINUED | OUTPATIENT
Start: 2018-10-22 | End: 2018-10-22 | Stop reason: HOSPADM

## 2018-10-22 RX ORDER — SIMETHICONE 20 MG/.3ML
EMULSION ORAL AS NEEDED
Status: DISCONTINUED | OUTPATIENT
Start: 2018-10-22 | End: 2018-10-22 | Stop reason: HOSPADM

## 2018-10-22 RX ORDER — PROPOFOL 10 MG/ML
INJECTION, EMULSION INTRAVENOUS AS NEEDED
Status: DISCONTINUED | OUTPATIENT
Start: 2018-10-22 | End: 2018-10-22 | Stop reason: SURG

## 2018-10-22 RX ADMIN — PROPOFOL 30 MG: 10 INJECTION, EMULSION INTRAVENOUS at 09:46

## 2018-10-22 RX ADMIN — PROPOFOL 20 MG: 10 INJECTION, EMULSION INTRAVENOUS at 09:27

## 2018-10-22 RX ADMIN — PROPOFOL 30 MG: 10 INJECTION, EMULSION INTRAVENOUS at 09:21

## 2018-10-22 RX ADMIN — SODIUM CHLORIDE 125 ML/HR: 0.9 INJECTION, SOLUTION INTRAVENOUS at 08:17

## 2018-10-22 RX ADMIN — LIDOCAINE HYDROCHLORIDE 3 ML: 20 INJECTION, SOLUTION EPIDURAL; INFILTRATION; INTRACAUDAL; PERINEURAL at 09:20

## 2018-10-22 RX ADMIN — PROPOFOL 30 MG: 10 INJECTION, EMULSION INTRAVENOUS at 09:24

## 2018-10-22 RX ADMIN — PROPOFOL 20 MG: 10 INJECTION, EMULSION INTRAVENOUS at 09:37

## 2018-10-22 RX ADMIN — PROPOFOL 30 MG: 10 INJECTION, EMULSION INTRAVENOUS at 09:41

## 2018-10-22 RX ADMIN — PROPOFOL 20 MG: 10 INJECTION, EMULSION INTRAVENOUS at 09:34

## 2018-10-22 RX ADMIN — PROPOFOL 120 MG: 10 INJECTION, EMULSION INTRAVENOUS at 09:20

## 2018-10-22 RX ADMIN — SODIUM CHLORIDE: 0.9 INJECTION, SOLUTION INTRAVENOUS at 09:45

## 2018-10-22 RX ADMIN — PROPOFOL 20 MG: 10 INJECTION, EMULSION INTRAVENOUS at 09:30

## 2018-10-22 RX ADMIN — PROPOFOL 30 MG: 10 INJECTION, EMULSION INTRAVENOUS at 09:44

## 2018-10-22 NOTE — DISCHARGE INSTRUCTIONS
Colonoscopia   LO QUE NECESITA SABER:   Margot colonoscopia es un procedimiento para examinar con un endoscopio el interior de cabrera colon (intestino)  Svetlana margot colonoscopia, es posible que le retiren pólipos o crecimientos de tejidos  Es normal que se sienta inflamado o que tenga molestia abdominal  Usted debería estar expulsando los gases  Si tiene hemorroides o si le removieron pólipos, usted podría presentar margot pequeña cantidad de sangrado  INSTRUCCIONES SOBRE EL ALEX HOSPITALARIA:   Busque atención médica de inmediato si:   · Usted presenta margot cantidad kwabena de analia tati brillante en anusha evacuaciones intestinales  · Cabrera abdomen está johana y firme y usted siente dolor intenso  · Usted tiene dificultad repentina para respirar  Pregúntele a cabrera Claybon Nickerson vitaminas y minerales son adecuados para usted  · Usted presenta sarpullido o urticaria  · Usted tiene fiebre dentro de las 24 horas después de cabrera procedimiento       · Usted no ha tenido margot evacuación intestinal después de 3 días de cabrera procedimiento  · Usted tiene preguntas o inquietudes acerca de cabrera condición o cuidado  Actividad:   · No levante nada, no se esfuerce o corra  por 3 días después de cabrera procedimiento  · Descanse después de cabrera procedimiento  A usted le black administrado medicamento para relajarse  No  maneje o tome decisiones importantes hasta el día siguiente de cabrera procedimiento  Regrese a anusha actividades normales según le indiquen  · Alivie los gases y la incomodidad de la inflamación  acostándose en cabrera costado derecho con margot almohada térmica sobre cabrera abdomen  Es posible que necesite caminar un poco para ayudar a eliminar los gases  Coma comidas pequeñas hasta que se alivie de la inflamación  Si a usted le removieron pólipos:  Por 7 días después de cabrera procedimiento:  · No  tome aspirina  · No  realice paseos largos en vasile  Acuda a anusha consultas de control con cabrera médico según le indicaron    Anote anusha preguntas para que se acuerde de hacerlas david anusha visitas  © 2017 [facility], an Fitz Fierro  Information is for End User's use only and may not be sold, redistributed or otherwise used for commercial purposes  All illustrations and images included in CareNotes® are the copyrighted property of A D A M , Inc  or [address]  Esta información es sólo para uso en educación  Collado intención no es darle un consejo médico sobre enfermedades o tratamientos  Colsulte con collado Herman Pinta farmacéutico antes de seguir cualquier régimen médico para saber si es seguro y efectivo para usted  Pólipos colorrectales   LO QUE NECESITA SABER:   Los pólipos colorrectales son pequeñas protuberancias de tejido que se encuentran en el forro del colon y recto  Delos Chard de los pólipos son hiperplásticos y típicamente son benignos (no cancerosos)  Ciertos tipos de pólipos llamados adenomatosos, podrían convertirse en cáncer  INSTRUCCIONES SOBRE EL ALEX HOSPITALARIA:   Shannan margot tariq de seguimiento con collado médico o gastroenterólogo wagner le indiquen:  Es probable que usted tenga que regresar para hacerse pruebas adicionales wagner otra colonoscopia  Anote anusha preguntas para que se acuerde de hacerlas david anusha visitas  Reduzca collado riesgo de pólipos colorrectales:   · Consuma alimentos saludables y variados:  Los alimentos saludables incluyen fruta, vegetales, panes integrales, productos lácteos bajo en grasa, frijoles, rubio sin grasa, y pescado  Pregunte si necesita seguir margot dieta especial     · Mantenga un peso saludable:  Pregunte al médico si necesita perder peso y cuánto  Pida ayuda con un programa para bajar de [place]  · Ejercicio:  Cristine Duhamel lentamente y shannan más a medida que se sienta más chivo  Consulte con collado médico antes de empezar un régimen de ejercicios  · Limite el consumo de alcohol  Collado riesgo de tener pólipos aumenta cuanto más se nancy      · No fume:  Si usted fuma, nunca es demasiado tarde para dejar de hacerlo  Pida información para dejar de fumar  Para [de-identified] y más información:   · [address] (NDDI)  [address] , [place] [address]  Phone: [phone]  Web Address: Sally Gamez  [facility] gov  Comuníquese con collado médico o gastroenterólogo si:   · Usted tiene fiebre  · Usted tiene escalofríos, tos o se siente débil y adolorido  · Usted tiene dolor abdominal que no desaparece o aumenta después de kenneth collado medicamento  · Collado abdomen se encuentra inflamado  · Usted pierde peso sin proponérselo  · Usted tiene preguntas o inquietudes acerca de collado condición o cuidado  Busque atención médica de inmediato o llame al 911 si:   · Usted tiene falta de aire repentina  · Tiene el ritmo cardíaco acelerado, la respiración acelerada o está demasiado mareado o débil para pararse  · Usted tiene dolor abdominal intenso  · Usted ve analia en anusha deposiciones  © 2017 [address] Wh[address]aker Information is for End User's use only and may not be sold, redistributed or otherwise used for commercial purposes  All illustrations and images included in CareNotes® are the copyrighted property of A D A M , Inc  or Pb Lyle  Esta información es sólo para uso en educación  Collado intención no es darle un consejo médico sobre enfermedades o tratamientos  Colsulte con collado Montgomery Jewels farmacéutico antes de seguir cualquier régimen médico para saber si es seguro y efectivo para usted  Las hemorroides   LO QUE NECESITA SABER:   Las hemorroides son Jennifer Marshal sanguíneos inflamados dentro del recto (hemorroides internas) o en el ano (hemorroides externas)  A veces, margot hemorroide puede hacer un prolapso  Barnes significa que se extiende fuera del ano          INSTRUCCIONES SOBRE EL ALEX HOSPITALARIA:   Busque atención médica de inmediato si:   · Usted siente mucho dolor en el recto o alrededor del ano  · Tiene dolor intenso en el abdomen y está vomitando  · Tiene sangrado por el ano que le empapa la ropa interior  Pregúntele a cabrera [place]ibr[place]oses vitaminas y minerales son adecuados para usted  · Usted tiene deposiciones intestinales frecuentes y dolorosas  · La hemorroide se ve o se siente más hinchada que de costumbre  · Usted no tiene evacuaciones intestinales por 2 días o más  · Ve o siente que Delilah Tire un tejido del ano  · Usted tiene preguntas o inquietudes acerca de cabrera condición o cuidado  Medicamentos:  Es posible que usted necesite alguno de los siguientes:  · Medicamento  para ayudar a disminuir el dolor, la hinchazón y la picazón  El medicamento puede venir wagner almohadilla, crema o ungüento  · Los laxantes  ayudan a tratar o a evitar el estreñimiento  · AINEs (Analgésicos antiinflamatorios no esteroides) wagner el ibuprofeno, ayudan a disminuir la inflamación, el dolor y la [place]  Los AINEs pueden causar sangrado estomacal o problemas renales en ciertas personas  Si usted jackie un medicamento anticoagulante, siempre pregúntele a cabrera médico si los CRISSY son seguros para usted  Siempre candelario la etiqueta de filomena medicamento y [place] instrucciones  · [place] anusha medicamentos wagner se le haya indicado  Consulte con cabrera médico si usted tan que cabrera medicamento no le está ayudando o si presenta efectos secundarios  Infórmele si es alérgico a cualquier medicamento  Mantenga margot lista actualizada de los [place], las vitaminas y los productos herbales que jackie  Incluya los siguientes datos de los medicamentos: cantidad, frecuencia y motivo de administración  Traiga con usted la lista o los envases de la píldoras a anusha citas de seguimiento  Lleve la lista de los medicamentos con usted en jaden de margot emergencia  El manejo de cabrera síntomas:   · Aplíquese hielo en el ano de 15 a 20 minutos [address] indicaciones    Use un paquete de hielo o ponga hielo molido dentro de Carlene Peek bolsa plástica  Cúbralo con margot toalla antes de aplicar sobre el ano  El hielo ayuda a evitar daño al tejido y a disminuir la inflamación y el dolor  · [place] un baño de asiento  Llene margot kristin de baño con 4 a 6 pulgadas de [place]  [address] usar un recipiente para baño de asiento que quepa en el inodoro  Siéntese en el baño de asiento david 15 minutos  Jeanette esto 3 veces al día y después de cada evacuación intestinal  El agua cálida va a ayudara reducir el dolor y la inflamación  · Mantenga limpia el área del ano  Lávese el área con agua tibia todos los días  El jabón podría causar irritación  Límpiese con [employer] o papel higiénico húmedo después de tener margot deposición intestinal  El papel higiénico seco podría irritar el área  Evite las hemorroides:   · No se force para tener un movimiento intestinal   No se siente en el inodoro david mucho tiempo  Estas acciones pueden aumentar la presión [employer] tejidos del recto y el ano  · [place] suficiente líquidos  Los líquidos pueden ayudar a prevenir el estreñimiento  Pregunte cuánto líquido debe kenneth cada día y cuáles líquidos son los más adecuados para usted  · Consuma margot variedad de alimentos ricos en fibra  One [facility], se incluyen frutas, vegetales y granos enteros  Pregunte a cabrera médico cuál es la cantidad de fibra que necesita al día  Es posible que deba kenneth un suplemento de [place]  · Ejercítese según indicaciones  El hacer ejercicio, wagner caminar, puede servir para que tenga margot evacuación intestinal  Pida a cabrera médico que lo ayude a crear un programa de ejercicio  · No tenga contacto sexual anal   El contacto sexual anal podría debilitar la piel alrededor del recto y el ano  · Evite levantar cosas pesadas  [place] puede causar esfuerzo y aumentar el riesgo de tener otra hemorroide  Acuda a anusha consultas de control con cabrera médico según le indicaron    Anote anusha preguntas para que se acuerde de hacerlas david mayte visitas  © 2017 [address] Wh[address]aker Information is for End User's use only and may not be sold, redistributed or otherwise used for commercial purposes  All illustrations and images included in CareNotes® are the copyrighted property of [employer]  or Pb Lyle  Esta información es sólo para uso en educación  Collado intención no es darle un consejo médico sobre enfermedades o tratamientos  Colsulte con collado Isacc Lauth farmacéutico antes de seguir cualquier régimen médico para saber si es seguro y efectivo para usted  Diverticulosis   LO QUE NECESITA SABER:   La diverticulosis es margot condición que ocasiona que se formen pequeñas bolsas llamadas divertícula en el intestino  Estas bolsas dificultan que las evacuaciones intestinales pasen a través del [address]  INSTRUCCIONES SOBRE EL ALEX HOSPITALARIA:   Busque atención médica de inmediato si:   · Siente dolor nhan en el lado taya de la parte inferior del abdomen  · Mayte deposiciones son de color alvarez oscuro o brillante  Pregúntele a collado Karis Boucher vitaminas y minerales son adecuados para usted  · Usted tiene fiebre o escalofríos  · Usted se siente mareado o aturdido  · Usted tiene náuseas o está vomitando  · Usted nota un cambio en mayte evacuaciones intestinales  · Usted tiene preguntas o inquietudes acerca de collado condición o cuidado  Medicamentos:   · Medicamentos,  puede administrarse para facilitar la evacuación intestinal  Puede que también necesite medicamentos para tratar síntomas tales Oscar Asif y luanne  · [place] mayte medicamentos wagner se le haya indicado  Consulte con collado médico si usted tan que collado medicamento no le está ayudando o si presenta efectos secundarios  Infórmele si es alérgico a cualquier medicamento  Mantenga margot lista actualizada de los [employer], las vitaminas y los productos herbales que jackie   Incluya los siguientes datos de los medicamentos: cantidad, frecuencia y motivo de administración  Traiga con usted la lista o los envases de la píldoras a ansuha citas de seguimiento  Lleve la lista de los medicamentos con usted en jaden de margot emergencia  Cuidados personales:  El objetivo del tratamiento es controlar los síntomas que tiene y evitar otros problemas wagner la diverticulitis  La diverticulitis es la inflamación o infección de los divertículos  Cabrera médico podría recomendarle cualquiera de los siguientes:  · Consuma margot variedad de alimentos ricos en fibra  Los alimentos altos en [employer] ayudan a regular los movimientos intestinales  Los alimentos altos en fibra incluyen los frijoles cocidos, frutas, verduras y algunos cereales  La mayoría de los adultos necesitan de 25 a 35 gramos de fibra por día  Cabrera médico le puede recomendar que [place]  Pregunte a cabrera médico cuánta fibra debería consumir  Aumentar la fibra lentamente  Usted podría presentar malestar o inflamación estomacal y gases si añade fibra a cabrera dieta demasiado rápido  Usted podría necesitar kenneth un suplemento de fibra si no está recibiendo suficiente de los alimentos  · [address] se le haya indicado  Es posible que usted necesite beber de 2 a 3 litros (8 a 12 vasos) de líquido cada día  Pregunte a cabrera médico sobre la cantidad de líquido que necesita kenneth todos los días y cuáles le recomienda  · La aplicación de calor  sobre el abdomen de 20 a 30 minutos cada 2 horas por los [employer] indiquen  El calor ayuda a disminuir el dolor y los espasmos musculares  Ayuda a prevenir la diverticulitis u otros síntomas:  Lo siguiente puede ayudar a disminuir el riesgo de diverticulitis o síntomas, wagner hemorragias  Hable con cabrera proveedor acerca de estos u otras cosas que puede hacer para prevenir los problemas que pueden ocurrir con diverticulosis  · Realice actividad física con regularidad  Pregunte a cabrera médico acerca del mejor plan de ejercicio para usted   El ejercicio puede [place] a tener evacuaciones intestinales regulares  Jeanette 30 minutos de ejercicio la mayoría de los días de [place]  · Mantenga un peso saludable  Consulte con collado médico cuánto debería pesar  Solicite que lo ayude a crear un plan para bajar de peso si tiene sobrepeso  · No fume  La nicotina y otros químicos en los cigarrillos incrementan el riesgo de diverticulitis  Pida información a collado médico si usted actualmente fuma y necesita ayuda para dejar de fumar  Los cigarrillos electrónicos o tabaco sin humo todavía contienen nicotina  Consulte con collado médico antes de [employer]  · Pregunte a collado médico si es seguro kenneth AINES  AINES puede aumentar collado riesgo de diverticulitis  Acuda a anusha consultas de control con collado médico según le indicaron  Anote anusha preguntas para que se acuerde de hacerlas david anusha visitas  © 2017 2600 [facility] Information is for End User's use only and may not be sold, redistributed or otherwise used for commercial purposes  All illustrations and images included in CareNotes® are the copyrighted property of A D A M , Inc  or Pb Lyle  Esta información es sólo para uso en educación  Collado intención no es darle un consejo médico sobre enfermedades o tratamientos  Colsulte con collado Beny Revering farmacéutico antes de seguir cualquier régimen médico para saber si es seguro y efectivo para usted  Dieta para la diverticulosis   CUIDADO AMBULATORIO:   Margot dieta para la diverticulosis  incluye alimentos altos en fibra  Los alimentos altos en [employer] ayudan a regular los movimientos intestinales  La fibra adicional podría reducir collado riesgo de formar divertículos nuevos (bolsas pequeñas) en anusha intestinos  Margot dieta john en fibra también podría ayudar a evitar la diverticulitis  La diverticulitis es margot condición dolorosa que ocurre cuando los divertículos se inflaman o se infectan   Usted no necesita evitar consumir jonathan secos, semillas, maíz o palomitas mientras está en margot dieta para la diverticulosis  Pregúntele a cabrera Frankie Banker vitaminas y minerales son adecuados para usted  · Usted tiene alguna pregunta sobre margot dieta con alto contenido de [place]  · Usted nota un cambio en anusha evacuaciones intestinales  · Usted siente molestia estomacal      · Usted tiene fiebre  · Usted siente dolor en el lado taya de la parte inferior del abdomen  · Tiene alguna pregunta acerca de cabrera condición o cuidado  Cantidad de [place] usted necesita:  Es posible que usted necesite de 25 a 35 gramos de fibra cada día  Pregunte a cabrera dietista o a cabrera médico cuánta fibra debería consumir  Aumente el consumo de fibra lentamente  Cuando usted [employer], podría sentir que tiene gas e inflamación  Es posible que usted necesite kenneth un suplemento de fibra si no consume suficiente fibra de los alimentos  [place] suficientes líquidos conforme aumente el consumo de Latricia en cabrera Alexa Gayle  Cabrera dietista o médico podría recomendarle 8 tazas de ocho onzas o más cada día  [place] Fuchs líquidos son los mejores para usted     Alimentos que tienen un alto contenido de fibra:   · Alimentos que contienen al menos 4 gramos de fibra por porción:      ¨ ? a ½ de margot taza de cereal con alto contenido de fibra (candelario la etiqueta nutricional en la caja)    ¨ ½ taza de moras o frambuesas    ¨ 4 ciruelas pasas    ¨ 1 alcachofa cocida    ¨ ½ taza de legumbres cocidas, wagner lentejas o frijoles rojos o pintos    · [employer] contienen 1 a 3 gramos de [place] por porción:      ¨ 1 rebanada de pan de layton integral, pan integral de daly o pan de daly    ¨ 4 galletas integrales    ¨ ½ taza de cereal con 1 a 3 gramos de [place] por porción (candelario la etiqueta nutricional en la caja)    ¨ 1 trozo de fruta, wagner Jairo harry, [place], [facility], [facility] o [facility]    ¨ 3 dátiles    ¨ ½ taza de albaricoques enlatados, ensalada de frutas, duraznos o peras    ¨ ½ taza de verduras crudas o cocidas, wagner moreno kumar repollo, espinaca, Johnathan [place]  © 2017 [address] Whittaker Information is for End User's use only and may not be sold, redistributed or otherwise used for commercial purposes  All illustrations and images included in CareNotes® are the copyrighted property of A D A M , Inc  or Pb Lyle  Esta información es sólo para uso en educación  Collado intención no es darle un consejo médico sobre enfermedades o tratamientos  Colsulte con collado Caitie Bjornstad farmacéutico antes de seguir cualquier régimen médico para saber si es seguro y efectivo para usted  Endoscopia superior   LO QUE NECESITA SABER:   Micheline endoscopia superior también se conoce wagner endoscopia gastrointestinal (GI) superior o esofagogastroduodenoscopia (EGD)  Usted podría sentirse inflamado, con gases o sentir molestia abdominal después de collado procedimiento  Collado garganta podría estar adolorida por 24 a 36 horas  Usted podría eructar o expulsar gas debido al aire que todavía está en collado cuerpo  INSTRUCCIONES SOBRE EL ALEX HOSPITALARIA:   Llame al 911 en jaden de presentar lo siguiente:   · Tiene dolor en el pecho o dificultad para respirar de forma repentina  Busque atención médica de inmediato si:   · Está mareado o siente que se va a desmayar  · Usted tiene dificultad para tragar  · Mayte evacuaciones intestinales son Ken Adie o negras  · Collado abdomen está johana y firme y usted siente dolor intenso  · Usted vomita analia  Pregúntele a collado Roslyn Edward vitaminas y minerales son adecuados para usted  · Usted se siente lleno o hinchado y no puede eructar o expulsar gas  · Usted no ha tenido micheline evacuación intestinal después de 3 días de collado procedimiento  · Usted tiene dolor de karri  · Usted tiene fiebre o escalofríos  · Usted tiene náuseas o está vomitando  · Usted tiene salpullido o urticaria  · Usted tiene preguntas o inquietudes acerca de collado endoscopia    Alivie el dolor de garganta:  Chupe pastillas para la garganta o hielo triturado  Jeanette gárgaras con margot pequeña cantidad de agua tibia con sal  Mezcle 1 cucharadita de sal y 1 taza de agua tibia para hacer agua salada  Alivie el gas y la molestia de la inflamación:  Acuéstese sobre collado costado derecho con margot almohada térmica sobre collado abdomen  Camine un poco para ayudar a expulsar el gas  Coma comidas pequeñas hasta que se alivie de la inflamación  Descanse después de collado procedimiento:  A usted le black administrado medicamento para relajarse  No  maneje o tome decisiones importantes hasta el día siguiente de collado procedimiento  Regrese a anusha actividades normales según le indiquen  Usted generalmente puede regresar al [facility] al día siguiente de collado procedimiento  Acuda a anusha consultas de control con collado médico según le indicaron  Anote anusha preguntas para que se acuerde de hacerlas david anusha visitas  © 2017 la [place]  Information is for End User's use only and may not be sold, redistributed or otherwise used for commercial purposes  All illustrations and images included in CareNotes® are the copyrighted property of [employer] , Inc  or Pb Lyle  Esta información es sólo para uso en educación  Collado intención no es darle un consejo médico sobre enfermedades o tratamientos  Colsulte con collado [place] farmacéutico antes de seguir cualquier régimen médico para saber si es seguro y efectivo para usted  Dieta para úlceras estomacales y gastritis   CUIDADO AMBULATORIO:   Margot dieta para úlceras estomacales y gastritis  es un plan alimenticio que limita los alimentos que irritan collado BJURHOLM  Ciertos alimentos [employer] síntomas wagner dolor de Yuriy QUEVEDO, acidez estomacal o indigestión  Alimentos que debe limitar o evitar:  Es posible que usted necesite evitar los alimentos ácidos, picantes o altos en grasa   No todos los alimentos afectan a las personas de la [address]  [employer]  Usted necesitará aprender cuáles alimentos empeoran anusha síntomas y tendrá que limitar esos alimentos  Los siguientes son algunos alimentos que podrían empeorar margot úlcera o los síntomas de la gastritis:  · Bebidas:      ¨ Leche entera y [place] chocolatada    ¨ Chocolate caliente y refrescos de cola    ¨ Cualquier bebida con cafeína    ¨ Café regular y descafeinado    ¨ Té de hierbabuena y menta randal    ¨ Té randal o danica, regular o descafeinado    ¨ Jugos de [facility] y toronja    ¨ Bebidas alcohólicas    · Especias y condimentos:      Roselind Ira y tati    ¨ Polvo de [facility]    ¨ Semilla de mostaza y [employer] moscada    · Otros alimentos:      ¨ Alimentos lácteos hechos de leche entera o crema    ¨ Chocolate    ¨ Quesos picantes o de sabor chivo, wagner de [employer] o renee dion    ¨ Carne con alto contenido de grasa y muy condimentada, wagner el [facility], salIra [facility], [facility], Nicole y embutidos    ¨ Chiles picantes    ¨ Productos derivados del tomate, wagner pasta de [place], salsa o jugo del mismo  Alimentos que puede incluir:  Consuma margot variedad de alimentos saludables de todos los grupos alimenticios  Consuma frutas, verduras, granos enteros y productos lácteos descremados o bajos en grasa  Los granos [employer], los cereales, la pasta y el arroz integral  Elija la carne [place], aves de hayward (rasta y [place]), pescado, frijoles, huevos y jonathan secos  Un plan alimenticio saludable tiene un contenido bajo de grasas no saludables, sal y azúcar  Las grasas saludables incluyen el aceite de hanna y de canola  Solicite a cabrera dietista más información acerca de un plan alimenticio saludable  Otras pautas útiles:   · No coma asa antes de acostarse  Deje de comer por lo menos 2 horas antes de acostarse  · Coma comidas pequeñas y con frecuencia  Es probable que cabrera estómago tolere mejor comidas pequeñas y frecuentes en vez de comidas grandes    © 2017 26[address] Alc[address] Whittaker Information is for End User's use only and may not be sold, redistributed or otherwise used for commercial purposes  All illustrations and images included in CareNotes® are the copyrighted property of A SADE A M , Inc  or Pb Lyle  Esta información es sólo para uso en educación  Collado intención no es darle un consejo médico sobre enfermedades o tratamientos  Colsulte con collado Osito Keas farmacéutico antes de seguir cualquier régimen médico para saber si es seguro y efectivo para usted  Duodenitis   LO QUE NECESITA SABER:   La duodenitis es la inflamación o irritación del duodeno  El duodeno es la primera parte del intestino langston y [place] asa debajo del SAE  INSTRUCCIONES SOBRE EL ALEX HOSPITALARIA:   Busque atención médica de inmediato si:   · Usted tiene dolor abdominal intenso  · Usted tiene evacuaciones con analia, negras o alquitranadas, o vómito  Pregúntele a collado Franci Block vitaminas y minerales son adecuados para usted  · Usted tiene síntomas nuevos o estos empeoran, aun después del [place]  · Usted tiene preguntas o inquietudes acerca de collado condición o cuidado  Medicamentos:   · Medicamentos,  se pueden administrar para tratar Childs Rj o para controlar el ácido estomacal     · [place] anusha medicamentos wagner se le haya indicado  Consulte con collado médico si usted tan que collado medicamento no le está ayudando o si presenta efectos secundarios  Infórmele si es alérgico a cualquier medicamento  Mantenga margot lista actualizada de los [employer], las vitaminas y los productos herbales que jackie  Incluya los siguientes datos de los medicamentos: cantidad, frecuencia y motivo de administración  Traiga con usted la lista o los envases de la píldoras a anusha citas de seguimiento  Lleve la lista de los medicamentos con usted en jaden de margot emergencia  Acuda a anusha consultas de control con collado médico según le indicaron    Anote anusha preguntas para que se acuerde de hacerlas david anusha visitas  No fume:  La nicotina y otros químicos contenidos en los cigarrillos y puros pueden causar daños en los vasos sanguíneos y pulmones  Pida información a cabrera médico si usted actualmente fuma y necesita ayuda para dejar de fumar  Los cigarrillos electrónicos o tabaco sin humo todavía contienen nicotina  Consulte con cabrera médico antes de [employer]  Limite o no consuma bebidas alcohólicas:  El alcohol puede empeorar cabrera duodenitis  Consulte con cabrera médico si usted necesita ayuda para dejar de kenneth alcohol  [address] baterías y objetos similares fuera del alcance de los niños:  Los bebés por lo general se llevan todo a la boca para explorar  Alex Drown botón son fácil de tragar y pueden causar daños graves  Cierre con [employer] las tapas de los compartimientos de las baterías de aparatos electrónicos wagner controles remoto  [address] baterías y los materiales tóxicos fuera del alcance de los niños  Use chapas o cierres de seguridad para que los niños no puedan [employer]  Controle o evite la duodenitis:   · No tome medicamentos CRISSY o aspirina a menos que así se lo indiquen  Estos analgésicos y medicamentos similares pueden causar irritación  Puede ser mejor si jackie los analgésicos antiinflamatorios no esteroides con alimentos, francisco es posible que no pueda tomarlos en absoluto  · No coma alimentos que le provocan irritación:  Los alimentos wagner las naranjas y la salsa pueden causar ardor o dolor  Consuma alimentos saludables y variados  [address] frutas (no las cítricas), verduras, productos lácteos descremados, legumbres, pan integral al [employer] las rubio [place] y pescado  Trate de comer porciones más pequeñas y kenneth agua con anusha comidas  No coma nada al menos por 3 horas antes de acostarse    © 2017 [address] Information is for End User's use only and may not be sold, redistributed or otherwise used for commercial purposes  All illustrations and images included in CareNotes® are the copyrighted property of [employer] [place] [employer] [place] , Inc  or Pb Lyle  Esta información es sólo para uso en educación  Cabrera intención no es darle un consejo médico sobre enfermedades o tratamientos  Colsulte con cabrera [place] farmacéutico antes de seguir cualquier régimen médico para saber si es seguro y efectivo para usted  Úlcera péptica   LO QUE NECESITA SABER:   Geoffery Stack péptica es margot llaga abierta en el revestimiento del estómago, el intestino o el esófago  Las [employer] pépticas tienen varios nombres, dependiendo de la localización  Jaguar Deutscher gástricas son [employer] pépticas en el estómago  Las úlceras duodenales son [employer] pépticas en el intestino  Geoffery Stack péptica en el esófago se conoce wagenr úlcera esofágica  Jaguar Deutscher pépticas podrían ser un problema a corto plazo o alargo plazo  INSTRUCCIONES SOBRE EL ALEX HOSPITALARIA:   Medicamentos:   · Medicamentos,  que disminuyen la cantidad de ácido que produce el estómago podrían administrarse  Es posible que usted también necesite medicamentos para proteger el revestimiento del estómago del ácido y de los antibióticos para tratar la infección por H  pylori  · [place] anusha medicamentos wagner se le haya indicado  Consulte con cabrera médico si usted tan que cabrera medicamento no le está ayudando o si presenta efectos secundarios  Infórmele si es alérgico a cualquier medicamento  Mantenga margot lista actualizada de los [place], las vitaminas y los productos herbales que jackie  Incluya los siguientes datos de los medicamentos: cantidad, frecuencia y motivo de administración  Traiga con usted la lista o los envases de la píldoras a anusha citas de seguimiento  Lleve la lista de los medicamentos con usted en jaden de margot emergencia  Acuda a anusha consultas de control con cabrera médico según le indicaron    Anote anusha preguntas para que se acuerde de hacerlas david anusha visitas  Nutrición:   · Evite las bebidas gaseosas, el alcohol y la cafeína  La cafeína se encuentra en algunos cafés, tés y [place]  También se encuentra en el chocolate  · No consuma alimentos que le provoquen malestar estomacal  Estos incluyen los picantes y alimentos ácidos, wagner las naranjas  · Consuma comidas en pequeñas cantidades con mas frecuencia en vez de grandes cantidades con menos frecuencia  El estómago vacío podría empeorar los síntomas  Deje de fumar:  Si usted fuma, nunca es demasiado tarde para dejar de hacerlo  El fumar aumenta el riesgo de desarrollar úlceras  Solicite información a collado médico si usted necesita ayuda para dejar de fumar  Pregúntele a collado [place] vitaminas y minerales son adecuados para usted  · Usted tiene fiebre  · Usted tiene diarrea o estreñimiento  · El dolor de estómago no desaparece o empeora después de kenneth medicamento  · Usted tiene preguntas o inquietudes acerca de collado condición o cuidado  Busque atención médica de inmediato o llame al 911 si:   · Tiene ritmo cardíaco acelerado, respiración acelerada o está demasiado mareado o débil para pararse  · Tiene dolor intenso en el estómago  · Collado vómito parece wagner café molido o contiene analia  · Mayte evacuaciones intestinales contienen Solange Gens o son de color danica  · Usted tiene falta de aire repentina  © 2017 [address] Whittaker Information is for End User's use only and may not be sold, redistributed or otherwise used for commercial purposes  All illustrations and images included in CareNotes® are the copyrighted property of A D A M , Inc  or Pb Lyle  Esta información es sólo para uso en educación  Collado intención no es darle un consejo médico sobre enfermedades o tratamientos  Colsulte con collado [place] Revering farmacéutico antes de seguir cualquier régimen médico para saber si es seguro y efectivo para usted

## 2018-10-22 NOTE — ANESTHESIA PREPROCEDURE EVALUATION
Review of Systems/Medical History  Patient summary reviewed    No history of anesthetic complications     Cardiovascular  Exercise tolerance (METS): >4,  Hyperlipidemia, Hypertension controlled,    Pulmonary  Asthma (daily inhaler) , well controlled/ stable Last rescue: today Asthma type of rescue: daily nebulizer,        GI/Hepatic    Bowel prep            Endo/Other  Diabetes well controlled type 2 Oral agent,      GYN  Negative gynecology ROS          Hematology  Negative hematology ROS      Musculoskeletal  Negative musculoskeletal ROS        Neurology  Negative neurology ROS      Psychology   Negative psychology ROS              Physical Exam    Airway    Mallampati score: II  TM Distance: >3 FB  Neck ROM: full     Dental       Cardiovascular  Rhythm: regular, Rate: normal,     Pulmonary  Breath sounds clear to auscultation,     Other Findings  Partial upper plate      Anesthesia Plan  ASA Score- 2     Anesthesia Type- IV sedation with anesthesia with ASA Monitors  Additional Monitors:   Airway Plan:         Plan Factors-Patient not instructed to abstain from smoking on day of procedure  Patient did not smoke on day of surgery  Induction- intravenous  Postoperative Plan-     Informed Consent- Anesthetic plan and risks discussed with patient

## 2018-10-22 NOTE — H&P
History and Physical -  [facility]  Marcelino Betancourt 77 y o  female MRN: [id number]                  HPI: Marcelino Betancourt is a 77y o  year old female who presents for odynophagia and rectal bleeding      REVIEW OF SYSTEMS: Per the HPI, and otherwise unremarkable  Historical Information   Past Medical History:   Diagnosis Date    Asthma     Diabetes mellitus ([address] )     Hyperlipidemia     Hypertension      Past Surgical History:   Procedure Laterality Date    COLONOSCOPY      5/18/16    TUBAL LIGATION       Social History   History   Alcohol Use    Yes     Comment: occasional     History   Drug Use No     History   Smoking Status    Light Tobacco Smoker   Smokeless Tobacco    Never Used     Comment: 5-6 cigarettes a day     History reviewed  No pertinent family history  Meds/Allergies     Prescriptions Prior to Admission   Medication    albuterol (2 5 mg/3 mL) 0 083 % nebulizer solution    aspirin (ECOTRIN LOW STRENGTH) 81 mg EC tablet    LISINOPRIL PO    sitaGLIPtin (JANUVIA) 100 mg tablet    albuterol (PROVENTIL HFA,VENTOLIN HFA) 90 mcg/act inhaler    atorvastatin (LIPITOR) 40 mg tablet    Blood Glucose Monitoring Suppl (ACURA BLOOD GLUCOSE METER) w/Device KIT    calcium carbonate 1250 MG capsule    dicyclomine (BENTYL) 20 mg tablet    famotidine (PEPCID) 20 mg tablet    FENOFIBRATE PO    fluticasone (FLONASE) 50 mcg/act nasal spray    fluticasone-salmeterol (ADVAIR DISKUS) 250-50 mcg/dose inhaler    gabapentin (NEURONTIN) 300 mg capsule    meloxicam (MOBIC) 15 mg tablet    metFORMIN (GLUCOPHAGE) 500 mg tablet    montelukast (SINGULAIR) 10 mg tablet       No Known Allergies    Objective     Blood pressure 149/85, pulse 104, temperature 97 5 °F (36 4 °C), temperature source Temporal, resp  rate 20, height 5' 1 5" (1 562 m), weight 58 5 kg (129 lb), SpO2 98 %, not currently breastfeeding        PHYSICAL EXAM    Gen: NAD  CV: RRR  CHEST: Clear  ABD: soft, NT/ND  EXT: no edema      ASSESSMENT/PLAN:  This is a 77y o  year old female here for EGD and colonoscopy, and she is stable and optimized for her procedure

## 2018-10-22 NOTE — OP NOTE
COLONOSCOPY    PROCEDURE: Colonoscopy/ Polypectomny (Cold Biopsy)    INDICATIONS: Hematochezia, hx of colon polyp    POST-OP DIAGNOSIS: See the impression below    SEDATION: Monitored anesthesia care, check anesthesia records    PHYSICAL EXAM:    /85   Pulse 104   Temp 97 5 °F (36 4 °C) (Temporal)   Resp 20   Ht 5' 1 5" (1 562 m)   Wt 58 5 kg (129 lb)   LMP  (LMP Unknown)   SpO2 98%   BMI 23 98 kg/m²   Body mass index is 23 98 kg/m²  General: NAD  Heart: S1 & S2 normal, RRR  Lungs: CTA, No rales or rhonchi  Abdomen: Soft, nontender, nondistended, good bowel sounds    CONSENT:  Informed consent was obtained for the procedure, including sedation after explaining the risks and benefits of the procedure  Risks including but not limited to bleeding, perforation, infection, aspiration were discussed in detail  Also explained about less than 100%$ sensitivity with the exam and other alternatives  PREPARATION:   EKG tracing, pulse oximetry, blood pressure were monitored throughout the procedure  Patient was identified by myself both verbally and by visual inspection of ID band  DESCRIPTION:   Patient was placed in the left lateral decubitus position and was sedated with the above medication  Digital rectal examination was performed  The colonoscope was introduced in to the anal canal and advanced up to cecum, which was identified by the appendiceal orifice and IC valve  A careful inspection was made as the colonoscope was withdrawn, including a retroflexed view of the rectum; findings and interventions are described below  Appropriate photodocumentation was obtained  The quality of the colonic preparation was good  FINDINGS:    - One diminutive polyp was found in transverse colon  Polypectomy was done using cold forceps biopsies  - an area in ascending colon was tattooed  On 1 of the folds there are reminiscent of flat polyps  The area was injected with normal saline to lift it    Multiple cold forceps biopsies were done to removed the polyp  Snare polypectomy was attempted with little success  - Small hemorrhoids during retroflex  - Mild diverticulosis in ascending colon and sigmoid colon  IMPRESSIONS:      Remnant  polyp removed  Small hemorrhoids during retroflexed  Mild diverticulosis  RECOMMENDATIONS:    Follow-up biopsy  Repeat colonoscopy in 6 months as the polyp in ascending colon  was removed in piecemeal fashion  COMPLICATIONS:  None; patient tolerated the procedure well      DISPOSITION: PACU           CONDITION: Stable

## 2018-10-22 NOTE — DISCHARGE INSTR - AVS FIRST PAGE
ESOPHAGOGASTRODUODENOSCOPY    PROCEDURE: EGD/ Biopsy    INDICATIONS: Abdominal pain, Epigastric    POST-OP DIAGNOSIS: See the impression below    SEDATION: Monitored anesthesia care, check anesthesia records    PHYSICAL EXAM:    Vitals:    10/22/18 0807   BP: 149/85   Pulse: 104   Resp: 20   Temp: 97 5 °F (36 4 °C)   SpO2: 98%    Body mass index is 23 98 kg/m²  General: NAD  Heart: S1 & S2 normal, RRR  Lungs: CTA, No rales or rhonchi  Abdomen: Soft, nontender, nondistended, good bowel sounds    CONSENT:  Informed consent was obtained for the procedure, including sedation after explaining the risks and benefits of the procedure  Risks including but not limited to bleeding, perforation, infection, aspiration were discussed in detail  Also explained about less than 100% sensitivity with the exam and other alternatives  PREPARATION:   EKG tracing, pulse oximetry, blood pressure were monitored throughout the procedure  Patient was identified by myself both verbally and by visual inspection of ID band  DESCRIPTION:   Patient was placed in the left lateral decubitus position and was sedated with the above medication  The gastroscope was introduced in to the oropharynx and the esophagus was intubated under direct visualization  Scope was passed down the esophagus up to 2nd part of the duodenum  A careful inspection was made as the gastroscope was withdrawn, including a retroflexed view of the stomach; findings and interventions are described below  FINDINGS:    #1  Esophagus and GEJ-Z-line at 40 cm  Normal esopahgus    #2  Stomach- Multiple small gastric ulcers found in antrum  Cold forceps biopsies taken to rule out H  Pylori  #3  Duodenum- duodenitis  IMPRESSIONS:      Gastric ulcers in antrum  RECOMMENDATIONS:     Follow up biopsy result  COMPLICATIONS:  None; patient tolerated the procedure well            DISPOSITION: PACU           CONDITION: Stable    COLONOSCOPY    PROCEDURE: Colonoscopy/ Polypectomny (Cold Biopsy)    INDICATIONS: Hematochezia, hx of colon polyp    POST-OP DIAGNOSIS: See the impression below    SEDATION: Monitored anesthesia care, check anesthesia records    PHYSICAL EXAM:    /85   Pulse 104   Temp 97 5 °F (36 4 °C) (Temporal)   Resp 20   Ht 5' 1 5" (1 562 m)   Wt 58 5 kg (129 lb)   LMP  (LMP Unknown)   SpO2 98%   BMI 23 98 kg/m²    Body mass index is 23 98 kg/m²  General: NAD  Heart: S1 & S2 normal, RRR  Lungs: CTA, No rales or rhonchi  Abdomen: Soft, nontender, nondistended, good bowel sounds    CONSENT:  Informed consent was obtained for the procedure, including sedation after explaining the risks and benefits of the procedure  Risks including but not limited to bleeding, perforation, infection, aspiration were discussed in detail  Also explained about less than 100%$ sensitivity with the exam and other alternatives  PREPARATION:   EKG tracing, pulse oximetry, blood pressure were monitored throughout the procedure  Patient was identified by myself both verbally and by visual inspection of ID band  DESCRIPTION:   Patient was placed in the left lateral decubitus position and was sedated with the above medication  Digital rectal examination was performed  The colonoscope was introduced in to the anal canal and advanced up to cecum, which was identified by the appendiceal orifice and IC valve  A careful inspection was made as the colonoscope was withdrawn, including a retroflexed view of the rectum; findings and interventions are described below  Appropriate photodocumentation was obtained  The quality of the colonic preparation was good  FINDINGS:    - One diminutive polyp was found in transverse colon  Polypectomy was done using cold forceps biopsies  - an area in ascending colon was tattooed  On 1 of the folds there are reminiscent of flat polyps  The area was injected with normal saline to lift it    Multiple cold forceps biopsies were done to removed the polyp  Snare polypectomy was attempted with little success  - Small hemorrhoids during retroflex  - Mild diverticulosis in ascending colon and sigmoid colon  IMPRESSIONS:      Remnant  polyp removed  Small hemorrhoids during retroflexed  Mild diverticulosis  RECOMMENDATIONS:    Follow-up biopsy  Repeat colonoscopy in 6 months as the polyp in ascending colon  was removed in piecemeal fashion  COMPLICATIONS:  None; patient tolerated the procedure well      DISPOSITION: PACU           CONDITION: Stable

## 2018-10-22 NOTE — OP NOTE
ESOPHAGOGASTRODUODENOSCOPY    PROCEDURE: EGD/ Biopsy    INDICATIONS: Abdominal pain, Epigastric    POST-OP DIAGNOSIS: See the impression below    SEDATION: Monitored anesthesia care, check anesthesia records    PHYSICAL EXAM:    Vitals:    10/22/18 0807   BP: 149/85   Pulse: 104   Resp: 20   Temp: 97 5 °F (36 4 °C)   SpO2: 98%    Body mass index is 23 98 kg/m²  General: NAD  Heart: S1 & S2 normal, RRR  Lungs: CTA, No rales or rhonchi  Abdomen: Soft, nontender, nondistended, good bowel sounds    CONSENT:  Informed consent was obtained for the procedure, including sedation after explaining the risks and benefits of the procedure  Risks including but not limited to bleeding, perforation, infection, aspiration were discussed in detail  Also explained about less than 100% sensitivity with the exam and other alternatives  PREPARATION:   EKG tracing, pulse oximetry, blood pressure were monitored throughout the procedure  Patient was identified by myself both verbally and by visual inspection of ID band  DESCRIPTION:   Patient was placed in the left lateral decubitus position and was sedated with the above medication  The gastroscope was introduced in to the oropharynx and the esophagus was intubated under direct visualization  Scope was passed down the esophagus up to 2nd part of the duodenum  A careful inspection was made as the gastroscope was withdrawn, including a retroflexed view of the stomach; findings and interventions are described below  FINDINGS:    #1  Esophagus and GEJ-Z-line at 40 cm  Normal esopahgus    #2  Stomach- Multiple small gastric ulcers found in antrum  Cold forceps biopsies taken to rule out H  Pylori  #3  Duodenum- duodenitis  IMPRESSIONS:      Gastric ulcers in antrum  RECOMMENDATIONS:     Follow up biopsy result  COMPLICATIONS:  None; patient tolerated the procedure well            DISPOSITION: PACU           CONDITION: Stable

## 2018-10-26 ENCOUNTER — TELEPHONE (OUTPATIENT)
Dept: GASTROENTEROLOGY | Facility: CLINIC | Age: 68
End: 2018-10-26

## 2019-02-06 ENCOUNTER — TELEPHONE (OUTPATIENT)
Dept: GASTROENTEROLOGY | Facility: MEDICAL CENTER | Age: 69
End: 2019-02-06

## 2019-02-06 ENCOUNTER — PREP FOR PROCEDURE (OUTPATIENT)
Dept: GASTROENTEROLOGY | Facility: MEDICAL CENTER | Age: 69
End: 2019-02-06

## 2019-02-06 DIAGNOSIS — Z86.010 HISTORY OF COLON POLYPS: Primary | ICD-10-CM

## 2019-02-06 NOTE — PATIENT INSTRUCTIONS
Pt is scheduled with dr Lc Palacios at [facility] end on 4/1/19 for 6m recall colon, I went over golytely prep through interp serv with pt and sent instructions to pt home in [address]   She is aware she will need a  to and  from and will get a call the day before with exact time of arrival

## 2019-02-06 NOTE — TELEPHONE ENCOUNTER
Pt is scheduled with dr Carlos Blackman at [facility] end on 4/1/19 for 6m recall colon, I went over golytely prep through interp serv with pt and sent instructions to pt home in [address]   She is aware she will need a  to and  from and will get a call the day before with exact time of arrival  Diabetic pt needs am

## 2019-03-20 ENCOUNTER — APPOINTMENT (EMERGENCY)
Dept: RADIOLOGY | Facility: HOSPITAL | Age: 69
End: 2019-03-20
Payer: COMMERCIAL

## 2019-03-20 ENCOUNTER — HOSPITAL ENCOUNTER (EMERGENCY)
Facility: HOSPITAL | Age: 69
Discharge: HOME | End: 2019-03-20
Attending: EMERGENCY MEDICINE | Admitting: EMERGENCY MEDICINE
Payer: COMMERCIAL

## 2019-03-20 VITALS
HEART RATE: 89 BPM | DIASTOLIC BLOOD PRESSURE: 60 MMHG | OXYGEN SATURATION: 96 % | BODY MASS INDEX: 23.77 KG/M2 | RESPIRATION RATE: 17 BRPM | SYSTOLIC BLOOD PRESSURE: 112 MMHG | WEIGHT: 127.87 LBS | TEMPERATURE: 97.5 F

## 2019-03-20 DIAGNOSIS — J20.9 ACUTE BRONCHITIS: Primary | ICD-10-CM

## 2019-03-20 PROCEDURE — 99283 EMERGENCY DEPT VISIT LOW MDM: CPT

## 2019-03-20 PROCEDURE — 71046 X-RAY EXAM CHEST 2 VIEWS: CPT

## 2019-03-20 PROCEDURE — 94640 AIRWAY INHALATION TREATMENT: CPT

## 2019-03-20 RX ORDER — FLUTICASONE PROPIONATE 50 MCG
1 SPRAY, SUSPENSION (ML) NASAL DAILY
Qty: 16 G | Refills: 0 | Status: SHIPPED | OUTPATIENT
Start: 2019-03-20 | End: 2020-03-19

## 2019-03-20 RX ORDER — ALBUTEROL SULFATE 2.5 MG/3ML
5 SOLUTION RESPIRATORY (INHALATION) ONCE
Status: COMPLETED | OUTPATIENT
Start: 2019-03-20 | End: 2019-03-20

## 2019-03-20 RX ORDER — PREDNISONE 20 MG/1
20 TABLET ORAL 2 TIMES DAILY
Qty: 10 TABLET | Refills: 0 | Status: SHIPPED | OUTPATIENT
Start: 2019-03-20

## 2019-03-20 RX ORDER — CETIRIZINE HYDROCHLORIDE 10 MG/1
10 TABLET ORAL DAILY
Qty: 30 TABLET | Refills: 0 | Status: SHIPPED | OUTPATIENT
Start: 2019-03-20 | End: 2020-03-19

## 2019-03-20 RX ADMIN — IPRATROPIUM BROMIDE 0.5 MG: 0.5 SOLUTION RESPIRATORY (INHALATION) at 09:28

## 2019-03-20 RX ADMIN — PREDNISONE 50 MG: 20 TABLET ORAL at 09:27

## 2019-03-20 RX ADMIN — ALBUTEROL SULFATE 5 MG: 2.5 SOLUTION RESPIRATORY (INHALATION) at 09:28

## 2019-03-20 NOTE — ED PROVIDER NOTES
History  Chief Complaint   Patient presents with    Cough     Coughing and rib pain bilaterally for 2 days  Nasal drainage, headache  History provided by:  Patient  URI   Presenting symptoms: cough    Presenting symptoms: no fever and no sore throat    Severity:  Moderate  Onset quality:  Gradual  Duration:  2 days  Timing:  Sporadic  Progression:  Worsening  Chronicity:  New  Relieved by:  Nothing  Worsened by:  Breathing  Ineffective treatments:  Inhaler  Associated symptoms: wheezing    Associated symptoms: no headaches and no neck pain    Risk factors: sick contacts (grandson was sick first)        Prior to Admission Medications   Prescriptions Last Dose Informant Patient Reported? Taking?    Blood Glucose Monitoring Suppl (ACURA BLOOD GLUCOSE METER) w/Device KIT   Yes No   Sig: Use to test blood sugars four times daily   FENOFIBRATE PO   Yes No   Sig: Take by mouth   LISINOPRIL PO   Yes No   Sig: Take by mouth   albuterol (2 5 mg/3 mL) 0 083 % nebulizer solution   Yes No   Sig: Inhale 2 5 mg every 4 (four) hours   albuterol (PROVENTIL HFA,VENTOLIN HFA) 90 mcg/act inhaler   Yes No   Sig: Inhale 2 puffs every 4 (four) hours   aspirin (ECOTRIN LOW STRENGTH) 81 mg EC tablet   Yes Yes   Sig: Take 81 mg by mouth daily   atorvastatin (LIPITOR) 40 mg tablet   Yes Yes   Sig: Take 40 mg by mouth   calcium carbonate 1250 MG capsule   Yes No   Sig: daily   dicyclomine (BENTYL) 20 mg tablet   No No   Sig: Take 1 tablet (20 mg total) by mouth every 6 (six) hours as needed (abd pain)   famotidine (PEPCID) 20 mg tablet   Yes No   Sig: Take 20 mg by mouth   fluticasone (FLONASE) 50 mcg/act nasal spray   Yes No   Si spray into each nostril   fluticasone-salmeterol (ADVAIR DISKUS) 250-50 mcg/dose inhaler   Yes No   Sig: Inhale 1 puff   gabapentin (NEURONTIN) 300 mg capsule   Yes No   Sig: Take 300 mg by mouth   meloxicam (MOBIC) 15 mg tablet   Yes No   Sig: Take 15 mg by mouth   metFORMIN (GLUCOPHAGE) 500 mg tablet Yes Yes   Sig: Take 500 mg by mouth daily with breakfast   montelukast (SINGULAIR) 10 mg tablet   Yes No   Sig: Take by mouth   polyethylene glycol (GOLYTELY) 4000 mL solution   No No   Sig: Take 4,000 mL by mouth once for 1 dose   sitaGLIPtin (JANUVIA) 100 mg tablet   Yes No   Sig: Take 100 mg by mouth      Facility-Administered Medications: None       Past Medical History:   Diagnosis Date    Asthma     Diabetes mellitus ([address] )     Hyperlipidemia     Hypertension        Past Surgical History:   Procedure Laterality Date    COLONOSCOPY      5/18/16    CT COLONOSCOPY FLX DX W/COLLJ SPEC WHEN PFRMD N/A 10/22/2018    Procedure: EGD AND COLONOSCOPY;  Surgeon: Esha Cates MD;  Location: [facility]; Service: Gastroenterology    TUBAL LIGATION         History reviewed  No pertinent family history  I have reviewed and agree with the history as documented  Social History     Tobacco Use    Smoking status: Light Tobacco Smoker    Smokeless tobacco: Never Used    Tobacco comment: 5-6 cigarettes a day   Substance Use Topics    Alcohol use: Yes     Comment: occasional    Drug use: No        Review of Systems   Constitutional: Negative for appetite change, chills and fever  HENT: Negative for sore throat  Respiratory: Positive for cough and wheezing  Negative for shortness of breath  Cardiovascular: Negative for chest pain and palpitations  Gastrointestinal: Negative for abdominal pain, diarrhea, nausea and vomiting  Genitourinary: Negative for dysuria and hematuria  Musculoskeletal: Negative for neck pain  Skin: Negative for rash  Neurological: Negative for dizziness, weakness and headaches  Psychiatric/Behavioral: Negative for suicidal ideas  All other systems reviewed and are negative  Physical Exam  Physical Exam   Constitutional: She is oriented to person, place, and time  Vital signs are normal  She appears well-developed and well-nourished  Non-toxic appearance     HENT: Head: Normocephalic and atraumatic  Right Ear: Tympanic membrane and external ear normal    Left Ear: Tympanic membrane and external ear normal    Nose: Nose normal    Mouth/Throat: Oropharynx is clear and moist    Eyes: Pupils are equal, round, and reactive to light  Conjunctivae and EOM are normal    Neck: Normal range of motion and full passive range of motion without pain  Neck supple  No Brudzinski's sign and no Kernig's sign noted  Cardiovascular: Normal rate, regular rhythm, normal heart sounds, intact distal pulses and normal pulses  No murmur heard  Pulmonary/Chest: Effort normal  No tachypnea  No respiratory distress  She has no decreased breath sounds  She has wheezes (mild end expiratory)  She has rhonchi  Abdominal: Soft  Bowel sounds are normal  She exhibits no distension  There is no tenderness  There is no rigidity, no rebound and no guarding  Musculoskeletal: Normal range of motion  Right lower leg: She exhibits no swelling  Left lower leg: She exhibits no swelling  Lymphadenopathy:     She has no cervical adenopathy  Neurological: She is alert and oriented to person, place, and time  She has normal strength and normal reflexes  No cranial nerve deficit or sensory deficit  Coordination and gait normal  GCS eye subscore is 4  GCS verbal subscore is 5  GCS motor subscore is 6  Skin: Skin is warm and dry  No rash noted  She is not diaphoretic  No pallor  Psychiatric: She has a normal mood and affect  Her speech is normal and behavior is normal  Judgment and thought content normal  Cognition and memory are normal    Nursing note and vitals reviewed        Vital Signs  ED Triage Vitals [03/20/19 0908]   Temperature Pulse Respirations Blood Pressure SpO2   97 5 °F (36 4 °C) 77 20 145/67 100 %      Temp Source Heart Rate Source Patient Position - Orthostatic VS BP Location FiO2 (%)   Temporal Monitor Sitting Right arm --      Pain Score       Worst Possible Pain Vitals:    03/20/19 0908 03/20/19 1023   BP: 145/67 112/60   Pulse: 77 89   Patient Position - Orthostatic VS: Sitting Sitting         Visual Acuity      ED Medications  Medications   ipratropium (ATROVENT) 0 02 % inhalation solution 0 5 mg (0 5 mg Nebulization Given 3/20/19 0928)   albuterol inhalation solution 5 mg (5 mg Nebulization Given 3/20/19 0928)   predniSONE tablet 50 mg (50 mg Oral Given 3/20/19 0049)       Diagnostic Studies  Results Reviewed     None                 XR chest 2 views   ED Interpretation by Aquiles Napier MD (03/20 1021)   No acute findings                 Procedures  Procedures       Phone Contacts  ED Phone Contact    ED Course  ED Course as of Mar 20 1024   Wed Mar 20, 2019   1021 Normal chest xray                                  MDM    Disposition  Final diagnoses:   Acute bronchitis     Time reflects when diagnosis was documented in both MDM as applicable and the Disposition within this note     Time User Action Codes Description Comment    3/20/2019 10:22 AM Shanon Sigala [J20 [id number]] Acute bronchitis       ED Disposition     ED Disposition Condition Date/Time Comment    Discharge Good Wed Mar 20, 2019 10:21 AM Tony Duffy discharge to home/self care              Follow-up Information     Follow up With Specialties Details Why Contact Info    Marge Shaikh MD Family Medicine Call in 3 days If symptoms worsen [address] [facility] [address] Brandie Diaz            Patient's Medications   Discharge Prescriptions    CETIRIZINE (ZYRTEC) 10 MG TABLET    Take 1 tablet (10 mg total) by mouth daily       Start Date: 3/20/2019 End Date: 3/19/2020       Order Dose: 10 mg       Quantity: 30 tablet    Refills: 0    DEXTROMETHORPHAN-GUAIFENESIN (MUCINEX DM)  MG PER 12 HR TABLET    Take 1 tablet by mouth every 12 (twelve) hours as needed for cough       Start Date: 3/20/2019 End Date: --       Order Dose: 1 tablet       Quantity: 10 tablet    Refills: 0    FLUTICASONE (FLONASE) 50 MCG/ACT NASAL SPRAY    1 spray into each nostril daily       Start Date: 3/20/2019 End Date: 3/19/2020       Order Dose: 1 spray       Quantity: 16 g    Refills: 0    PREDNISONE 20 MG TABLET    Take 1 tablet (20 mg total) by mouth 2 (two) times a day       Start Date: 3/20/2019 End Date: --       Order Dose: 20 mg       Quantity: 10 tablet    Refills: 0     No discharge procedures on file      ED Provider  Electronically Signed by           Keli Little MD  03/20/19 1024

## 2019-03-20 NOTE — DISCHARGE INSTRUCTIONS
Bronquitis aguda  LO QUE NECESITAS SABER:  La bronquitis aguda es hinchazón e irritación en las vías respiratorias de los pulmones  Esta irritación puede hacer que tosa o tenga otros problemas para respirar  La bronquitis aguda a menudo comienza debido a otra enfermedad, wagner un resfriado o la gripe  La enfermedad se extiende desde la nariz y la garganta hasta la tráquea y las vías respiratorias  La bronquitis a menudo se llama un cofre frío  La bronquitis aguda dura de 3 a 6 semanas y generalmente no es margot enfermedad grave  Tu tos puede durar [employer]  INSTRUCCIONES DE DESCARGA:  Regrese al departamento de emergencia si:  Usted tose analia  Tus labios o tus uñas se vuelven azules  Siente que no está recibiendo suficiente aire cuando respira  Comuníquese con cabrera proveedor de atención médica si:  Tienes fiebre  Mayte problemas de respiración no desaparecen o Carolina Tang  Cabrera tos no mejora dentro de 4 semanas  Usted tiene preguntas o inquietudes sobre cabrera condición o cuidado  Aida los irritantes en el aire  Evite los productos químicos, los humos y [place]  Use margot mascarilla facial si debe evitar el polvo o los humos  Permanezca adentro los días en que los niveles de contaminación del aire son altos  Si tiene [place], quédese adentro cuando el conteo de polen sea [id number] [address]  No use productos en aerosol, wagner desodorante en aerosol, repelente de insectos y laca para el rosie  No fume ni esté cerca de otras personas que fumen  La nicotina y otras sustancias químicas en los cigarros y puros dañan los cilios que eliminan el moco de los pulmones  Consulte a cabrera proveedor de atención médica para obtener información si actualmente fuma y necesita ayuda para dejar de fumar  Los cigarrillos electrónicos o el tabaco sin humo todavía contienen nicotina  Hable con cabrera proveedor de [employer] antes de usar estos productos  Vicki líquidos según las indicaciones   Los líquidos Alexis Jose Juan a [employer] respiratorias húmedas y [facility] a [employer] mucosidad  Es posible que deba beber más líquidos cuando tenga bronquitis aguda  Pregunte cuánto líquido beber Evia Saber y qué líquidos son los mejores para usted  Use un humidificador o vaporizador  Use un humidificador de vapor frío o un vaporizador para aumentar la humedad del aire en cabrera hogar  [place] puede facilitarle la respiración y ayudar a disminuir la tos  Los descongestionantes ayudan a aflojar la mucosidad de los pulmones y a facilitar la tos  [place] puede ayudarte a respirar mejor  Los supresores de la tos disminuyen cabrera necesidad de toser  Si la tos produce mucosidad, no tome un antitusivo a menos que cabrera proveedor de [facility] se lo indique  Cabrera proveedor de [employer] [facility] puede sugerirle que tome un supresor de la tos por la noche para que pueda descansar      Inhaladores Un inhalador le da medicamentos para abrir anusha vías respiratorias

## 2019-03-26 ENCOUNTER — TELEPHONE (OUTPATIENT)
Dept: GASTROENTEROLOGY | Facility: CLINIC | Age: 69
End: 2019-03-26

## 2019-03-26 NOTE — TELEPHONE ENCOUNTER
Anya Chung pt    Please send the pts colonoscopy prep to [facility] in [place] on [facility]

## 2019-03-29 ENCOUNTER — ANESTHESIA EVENT (OUTPATIENT)
Dept: GASTROENTEROLOGY | Facility: MEDICAL CENTER | Age: 69
End: 2019-03-29
Payer: COMMERCIAL

## 2019-04-01 ENCOUNTER — ANESTHESIA (OUTPATIENT)
Dept: GASTROENTEROLOGY | Facility: MEDICAL CENTER | Age: 69
End: 2019-04-01
Payer: COMMERCIAL

## 2019-04-01 ENCOUNTER — HOSPITAL ENCOUNTER (OUTPATIENT)
Facility: MEDICAL CENTER | Age: 69
Setting detail: OUTPATIENT SURGERY
Discharge: HOME | End: 2019-04-01
Attending: INTERNAL MEDICINE | Admitting: INTERNAL MEDICINE
Payer: COMMERCIAL

## 2019-04-01 VITALS
HEIGHT: 62 IN | DIASTOLIC BLOOD PRESSURE: 63 MMHG | SYSTOLIC BLOOD PRESSURE: 131 MMHG | RESPIRATION RATE: 20 BRPM | BODY MASS INDEX: 23 KG/M2 | HEART RATE: 83 BPM | OXYGEN SATURATION: 99 % | WEIGHT: 125 LBS | TEMPERATURE: 97.8 F

## 2019-04-01 DIAGNOSIS — Z86.010 HISTORY OF COLON POLYPS: ICD-10-CM

## 2019-04-01 PROCEDURE — ERR1 ERRONEOUS ENCOUNTER-DISREGARD: Performed by: INTERNAL MEDICINE

## 2019-04-01 PROCEDURE — 45381 COLONOSCOPY SUBMUCOUS NJX: CPT | Performed by: INTERNAL MEDICINE

## 2019-04-01 PROCEDURE — 88305 TISSUE EXAM BY PATHOLOGIST: CPT | Performed by: PATHOLOGY

## 2019-04-01 PROCEDURE — 45385 COLONOSCOPY W/LESION REMOVAL: CPT | Performed by: INTERNAL MEDICINE

## 2019-04-01 PROCEDURE — 45380 COLONOSCOPY AND BIOPSY: CPT | Performed by: INTERNAL MEDICINE

## 2019-04-01 RX ORDER — SODIUM CHLORIDE 9 MG/ML
125 INJECTION, SOLUTION INTRAVENOUS CONTINUOUS
Status: DISCONTINUED | OUTPATIENT
Start: 2019-04-01 | End: 2019-04-01 | Stop reason: HOSPADM

## 2019-04-01 RX ORDER — PROPOFOL 10 MG/ML
INJECTION, EMULSION INTRAVENOUS AS NEEDED
Status: DISCONTINUED | OUTPATIENT
Start: 2019-04-01 | End: 2019-04-01 | Stop reason: SURG

## 2019-04-01 RX ADMIN — PROPOFOL 50 MG: 10 INJECTION, EMULSION INTRAVENOUS at 09:43

## 2019-04-01 RX ADMIN — SODIUM CHLORIDE: 0.9 INJECTION, SOLUTION INTRAVENOUS at 09:51

## 2019-04-01 RX ADMIN — PROPOFOL 50 MG: 10 INJECTION, EMULSION INTRAVENOUS at 09:38

## 2019-04-01 RX ADMIN — PROPOFOL 50 MG: 10 INJECTION, EMULSION INTRAVENOUS at 09:56

## 2019-04-01 RX ADMIN — PROPOFOL 50 MG: 10 INJECTION, EMULSION INTRAVENOUS at 09:53

## 2019-04-01 RX ADMIN — PROPOFOL 50 MG: 10 INJECTION, EMULSION INTRAVENOUS at 09:46

## 2019-04-01 RX ADMIN — PROPOFOL 50 MG: 10 INJECTION, EMULSION INTRAVENOUS at 09:40

## 2019-04-01 RX ADMIN — SODIUM CHLORIDE 125 ML/HR: 0.9 INJECTION, SOLUTION INTRAVENOUS at 09:11

## 2019-04-01 RX ADMIN — PROPOFOL 50 MG: 10 INJECTION, EMULSION INTRAVENOUS at 10:02

## 2019-04-01 RX ADMIN — PROPOFOL 50 MG: 10 INJECTION, EMULSION INTRAVENOUS at 09:50

## 2019-04-01 RX ADMIN — PROPOFOL 100 MG: 10 INJECTION, EMULSION INTRAVENOUS at 09:36

## 2020-12-06 ENCOUNTER — HOSPITAL ENCOUNTER (EMERGENCY)
Facility: HOSPITAL | Age: 70
Discharge: HOME | End: 2020-12-06
Attending: EMERGENCY MEDICINE
Payer: COMMERCIAL

## 2020-12-06 ENCOUNTER — APPOINTMENT (EMERGENCY)
Dept: RADIOLOGY | Facility: HOSPITAL | Age: 70
End: 2020-12-06
Payer: COMMERCIAL

## 2020-12-06 VITALS
BODY MASS INDEX: 23.23 KG/M2 | TEMPERATURE: 98.4 F | RESPIRATION RATE: 16 BRPM | DIASTOLIC BLOOD PRESSURE: 99 MMHG | OXYGEN SATURATION: 96 % | SYSTOLIC BLOOD PRESSURE: 178 MMHG | HEART RATE: 91 BPM | WEIGHT: 126.98 LBS

## 2020-12-06 DIAGNOSIS — R05.8 COUGH WITH EXPOSURE TO COVID-19 VIRUS: ICD-10-CM

## 2020-12-06 DIAGNOSIS — Z20.822 COUGH WITH EXPOSURE TO COVID-19 VIRUS: ICD-10-CM

## 2020-12-06 DIAGNOSIS — B37.3 VAGINAL CANDIDIASIS: Primary | ICD-10-CM

## 2020-12-06 DIAGNOSIS — U07.1 LAB TEST POSITIVE FOR DETECTION OF COVID-19 VIRUS: ICD-10-CM

## 2020-12-06 PROCEDURE — 71045 X-RAY EXAM CHEST 1 VIEW: CPT

## 2020-12-06 PROCEDURE — 99284 EMERGENCY DEPT VISIT MOD MDM: CPT | Performed by: PHYSICIAN ASSISTANT

## 2020-12-06 PROCEDURE — 99283 EMERGENCY DEPT VISIT LOW MDM: CPT

## 2020-12-06 RX ORDER — PREDNISONE 20 MG/1
20 TABLET ORAL 3 TIMES DAILY
Qty: 15 TABLET | Refills: 0 | Status: SHIPPED | OUTPATIENT
Start: 2020-12-06 | End: 2020-12-11

## 2020-12-06 RX ORDER — MELATONIN
2000 DAILY
Qty: 14 TABLET | Refills: 0 | Status: SHIPPED | OUTPATIENT
Start: 2020-12-06 | End: 2020-12-13

## 2020-12-06 RX ORDER — FLUCONAZOLE 100 MG/1
200 TABLET ORAL ONCE
Status: COMPLETED | OUTPATIENT
Start: 2020-12-06 | End: 2020-12-06

## 2020-12-06 RX ORDER — MULTIVIT WITH MINERALS/LUTEIN
1000 TABLET ORAL 2 TIMES DAILY
Qty: 14 TABLET | Refills: 0 | Status: SHIPPED | OUTPATIENT
Start: 2020-12-06 | End: 2020-12-13

## 2020-12-06 RX ORDER — MULTIVITAMIN
1 CAPSULE ORAL DAILY
Qty: 7 CAPSULE | Refills: 0 | Status: SHIPPED | OUTPATIENT
Start: 2020-12-06 | End: 2020-12-13

## 2020-12-06 RX ADMIN — FLUCONAZOLE 200 MG: 100 TABLET ORAL at 14:13

## 2020-12-11 ENCOUNTER — NURSE TRIAGE (OUTPATIENT)
Dept: OTHER | Facility: OTHER | Age: 70
End: 2020-12-11

## (undated) DEVICE — THE ISNARE SYSTEM-LARIAT SNARE IS USED TO INJECT VARIOUS TYPES OF MEDIA AND TO GRASP, DISSECT AND TRANSECT TISSUE DURING GASTROINTESTINAL ENDOSCOPY PROCEDURES, AND CAN BE USED WITH OR WITHOUT MONOPOLAR DIATHERMIC ENERGY.: Brand: ISNARE